# Patient Record
Sex: MALE | Race: WHITE | NOT HISPANIC OR LATINO | Employment: OTHER | URBAN - METROPOLITAN AREA
[De-identification: names, ages, dates, MRNs, and addresses within clinical notes are randomized per-mention and may not be internally consistent; named-entity substitution may affect disease eponyms.]

---

## 2019-08-15 ENCOUNTER — TELEPHONE (OUTPATIENT)
Dept: SURGERY | Facility: CLINIC | Age: 64
End: 2019-08-15

## 2019-08-15 ENCOUNTER — OFFICE VISIT (OUTPATIENT)
Dept: SURGERY | Facility: CLINIC | Age: 64
End: 2019-08-15
Payer: COMMERCIAL

## 2019-08-15 VITALS
SYSTOLIC BLOOD PRESSURE: 120 MMHG | RESPIRATION RATE: 16 BRPM | OXYGEN SATURATION: 96 % | WEIGHT: 245 LBS | HEIGHT: 70 IN | DIASTOLIC BLOOD PRESSURE: 78 MMHG | HEART RATE: 81 BPM | BODY MASS INDEX: 35.07 KG/M2

## 2019-08-15 DIAGNOSIS — K42.0 UMBILICAL HERNIA WITH OBSTRUCTION: Primary | ICD-10-CM

## 2019-08-15 PROCEDURE — 99244 OFF/OP CNSLTJ NEW/EST MOD 40: CPT | Performed by: SURGERY

## 2019-08-15 ASSESSMENT — MIFFLIN-ST. JEOR: SCORE: 1907.56

## 2019-08-15 NOTE — PROGRESS NOTES
"Fredi is a 63 year old male who presents for hernia evaluation.  The patient has noticed a bulge at the umbilicus.  Pain has not been present.  Symptoms began 1 year ago.  The bulge has gotten larger over the intervening time. He built a house during this time as well. The patient has not had previous abdominal surgery.  Patient does not report that increased activity/lifting causes pain. Employment does not require lifting; he retired from being a  about ten years ago. 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    Constipation No  Dysuria No  Cough No  Smoking Yes    Pt's chart has been reviewed for PMH, PSH, allergies, medications, social and family history.    ROS:  Pulm:  No shortness of breath, dyspnea on exertion, cough, or hemoptysis  CV:  negative  ABD:  See chief complaint  :  Negative  All other systems on 10 point review are negative.    /78   Pulse 81   Resp 16   Ht 1.778 m (5' 10\")   Wt 111.1 kg (245 lb)   SpO2 96%   BMI 35.15 kg/m    Physical exam:  Patient able to get up on table without difficulty.  abdomen is soft without significant tenderness, masses, organomegaly or guarding  bowel sounds are positive and no caput medusa noted.  There is visible eversion to the umbilical skin which is a fat containing and only partly reducible umbilical hernia. It is non-tender on exam.    Imaging  None    Assesment: umbilical hernia    Plan: The nature of hernias, anatomic considerations, indications and approaches to repair were discussed.  Risks of operative intervention were discussed including infection, bleeding, harm to structures as well as recurrence, which is about 5% per decade of life.  Post operative recuperation and activity limitations were reviewed as well.  The patient is interested in pursuing repair and we will assist in scheduling this with him.    Tung Arnold MD  8/15/2019 9:56 AM    Please route or send letter to:  Primary Care Provider " (PCP)

## 2019-08-15 NOTE — LETTER
"August 15, 2019    RE: Fredi Reilly, : 1955      Fredi is a 63 year old male who presents for hernia evaluation.  The patient has noticed a bulge at the umbilicus.  Pain has not been present.  Symptoms began 1 year ago.  The bulge has gotten larger over the intervening time. He built a house during this time as well. The patient has not had previous abdominal surgery.  Patient does not report that increased activity/lifting causes pain. Employment does not require lifting; he retired from being a  about ten years ago. 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     Constipation No  Dysuria No  Cough No  Smoking Yes     Pt's chart has been reviewed for PMH, PSH, allergies, medications, social and family history.     ROS:  Pulm:  No shortness of breath, dyspnea on exertion, cough, or hemoptysis  CV:  negative  ABD:  See chief complaint  :  Negative  All other systems on 10 point review are negative.     /78   Pulse 81   Resp 16   Ht 1.778 m (5' 10\")   Wt 111.1 kg (245 lb)   SpO2 96%   BMI 35.15 kg/m    Physical exam:  Patient able to get up on table without difficulty.  abdomen is soft without significant tenderness, masses, organomegaly or guarding  bowel sounds are positive and no caput medusa noted.  There is visible eversion to the umbilical skin which is a fat containing and only partly reducible umbilical hernia. It is non-tender on exam.     Imaging  None     Assesment: umbilical hernia     Plan: The nature of hernias, anatomic considerations, indications and approaches to repair were discussed.  Risks of operative intervention were discussed including infection, bleeding, harm to structures as well as recurrence, which is about 5% per decade of life.  Post operative recuperation and activity limitations were reviewed as well.  The patient is interested in pursuing repair and we will assist in scheduling this with him.     Tung Arnold MD  "

## 2019-10-25 ASSESSMENT — MIFFLIN-ST. JEOR: SCORE: 1886.38

## 2019-11-01 ENCOUNTER — APPOINTMENT (OUTPATIENT)
Dept: SURGERY | Facility: PHYSICIAN GROUP | Age: 64
End: 2019-11-01
Payer: MEDICARE

## 2019-11-01 ENCOUNTER — ANESTHESIA (OUTPATIENT)
Dept: SURGERY | Facility: CLINIC | Age: 64
End: 2019-11-01
Payer: MEDICARE

## 2019-11-01 ENCOUNTER — ANESTHESIA EVENT (OUTPATIENT)
Dept: SURGERY | Facility: CLINIC | Age: 64
End: 2019-11-01
Payer: MEDICARE

## 2019-11-01 ENCOUNTER — HOSPITAL ENCOUNTER (OUTPATIENT)
Facility: CLINIC | Age: 64
Discharge: HOME | End: 2019-11-01
Attending: SURGERY | Admitting: SURGERY
Payer: MEDICARE

## 2019-11-01 VITALS
TEMPERATURE: 97.1 F | SYSTOLIC BLOOD PRESSURE: 135 MMHG | DIASTOLIC BLOOD PRESSURE: 89 MMHG | RESPIRATION RATE: 16 BRPM | OXYGEN SATURATION: 96 % | HEIGHT: 69 IN | BODY MASS INDEX: 36.58 KG/M2 | WEIGHT: 247 LBS | HEART RATE: 69 BPM

## 2019-11-01 DIAGNOSIS — K42.9 UMBILICAL HERNIA WITHOUT OBSTRUCTION AND WITHOUT GANGRENE: Primary | ICD-10-CM

## 2019-11-01 PROCEDURE — 25000132 ZZH RX MED GY IP 250 OP 250 PS 637: Mod: GY | Performed by: SURGERY

## 2019-11-01 PROCEDURE — 36000052 ZZH SURGERY LEVEL 2 EA 15 ADDTL MIN: Performed by: SURGERY

## 2019-11-01 PROCEDURE — 25000125 ZZHC RX 250: Performed by: NURSE ANESTHETIST, CERTIFIED REGISTERED

## 2019-11-01 PROCEDURE — 25000128 H RX IP 250 OP 636: Performed by: ANESTHESIOLOGY

## 2019-11-01 PROCEDURE — 40000306 ZZH STATISTIC PRE PROC ASSESS II: Performed by: SURGERY

## 2019-11-01 PROCEDURE — 93010 ELECTROCARDIOGRAM REPORT: CPT | Performed by: INTERNAL MEDICINE

## 2019-11-01 PROCEDURE — 71000027 ZZH RECOVERY PHASE 2 EACH 15 MINS: Performed by: SURGERY

## 2019-11-01 PROCEDURE — 36000050 ZZH SURGERY LEVEL 2 1ST 30 MIN: Performed by: SURGERY

## 2019-11-01 PROCEDURE — C1781 MESH (IMPLANTABLE): HCPCS | Performed by: SURGERY

## 2019-11-01 PROCEDURE — 37000008 ZZH ANESTHESIA TECHNICAL FEE, 1ST 30 MIN: Performed by: SURGERY

## 2019-11-01 PROCEDURE — 49587 ZZHC REPAIR UMBILICAL HERN,5+Y/O, INCARCERATED OR STRANGULATED: CPT | Mod: AS | Performed by: PHYSICIAN ASSISTANT

## 2019-11-01 PROCEDURE — 25800030 ZZH RX IP 258 OP 636: Performed by: ANESTHESIOLOGY

## 2019-11-01 PROCEDURE — 25000125 ZZHC RX 250: Performed by: ANESTHESIOLOGY

## 2019-11-01 PROCEDURE — 25000128 H RX IP 250 OP 636: Performed by: SURGERY

## 2019-11-01 PROCEDURE — 49587 ZZHC REPAIR UMBILICAL HERN,5+Y/O, INCARCERATED OR STRANGULATED: CPT | Performed by: SURGERY

## 2019-11-01 PROCEDURE — 71000012 ZZH RECOVERY PHASE 1 LEVEL 1 FIRST HR: Performed by: SURGERY

## 2019-11-01 PROCEDURE — 25000128 H RX IP 250 OP 636: Performed by: PHYSICIAN ASSISTANT

## 2019-11-01 PROCEDURE — 25000128 H RX IP 250 OP 636: Performed by: NURSE ANESTHETIST, CERTIFIED REGISTERED

## 2019-11-01 PROCEDURE — 25000132 ZZH RX MED GY IP 250 OP 250 PS 637: Mod: GY | Performed by: ANESTHESIOLOGY

## 2019-11-01 PROCEDURE — 37000009 ZZH ANESTHESIA TECHNICAL FEE, EACH ADDTL 15 MIN: Performed by: SURGERY

## 2019-11-01 PROCEDURE — 27210794 ZZH OR GENERAL SUPPLY STERILE: Performed by: SURGERY

## 2019-11-01 DEVICE — MESH VENTRALEX HERNIA 2.5" CIRCLE MED W/STRAP 5950008: Type: IMPLANTABLE DEVICE | Status: FUNCTIONAL

## 2019-11-01 RX ORDER — ONDANSETRON 2 MG/ML
4 INJECTION INTRAMUSCULAR; INTRAVENOUS EVERY 30 MIN PRN
Status: DISCONTINUED | OUTPATIENT
Start: 2019-11-01 | End: 2019-11-01 | Stop reason: HOSPADM

## 2019-11-01 RX ORDER — DEXAMETHASONE SODIUM PHOSPHATE 4 MG/ML
INJECTION, SOLUTION INTRA-ARTICULAR; INTRALESIONAL; INTRAMUSCULAR; INTRAVENOUS; SOFT TISSUE PRN
Status: DISCONTINUED | OUTPATIENT
Start: 2019-11-01 | End: 2019-11-01

## 2019-11-01 RX ORDER — MEPERIDINE HYDROCHLORIDE 50 MG/ML
12.5 INJECTION INTRAMUSCULAR; INTRAVENOUS; SUBCUTANEOUS
Status: DISCONTINUED | OUTPATIENT
Start: 2019-11-01 | End: 2019-11-01 | Stop reason: HOSPADM

## 2019-11-01 RX ORDER — HYDROCODONE BITARTRATE AND ACETAMINOPHEN 5; 325 MG/1; MG/1
1-2 TABLET ORAL EVERY 4 HOURS PRN
Status: DISCONTINUED | OUTPATIENT
Start: 2019-11-01 | End: 2019-11-01 | Stop reason: HOSPADM

## 2019-11-01 RX ORDER — HYDRALAZINE HYDROCHLORIDE 20 MG/ML
2.5-5 INJECTION INTRAMUSCULAR; INTRAVENOUS EVERY 10 MIN PRN
Status: DISCONTINUED | OUTPATIENT
Start: 2019-11-01 | End: 2019-11-01 | Stop reason: HOSPADM

## 2019-11-01 RX ORDER — NALOXONE HYDROCHLORIDE 0.4 MG/ML
.1-.4 INJECTION, SOLUTION INTRAMUSCULAR; INTRAVENOUS; SUBCUTANEOUS
Status: DISCONTINUED | OUTPATIENT
Start: 2019-11-01 | End: 2019-11-01 | Stop reason: HOSPADM

## 2019-11-01 RX ORDER — METOPROLOL TARTRATE 1 MG/ML
1-2 INJECTION, SOLUTION INTRAVENOUS EVERY 5 MIN PRN
Status: DISCONTINUED | OUTPATIENT
Start: 2019-11-01 | End: 2019-11-01 | Stop reason: HOSPADM

## 2019-11-01 RX ORDER — ONDANSETRON 2 MG/ML
INJECTION INTRAMUSCULAR; INTRAVENOUS PRN
Status: DISCONTINUED | OUTPATIENT
Start: 2019-11-01 | End: 2019-11-01

## 2019-11-01 RX ORDER — GLYCOPYRROLATE 0.2 MG/ML
INJECTION, SOLUTION INTRAMUSCULAR; INTRAVENOUS PRN
Status: DISCONTINUED | OUTPATIENT
Start: 2019-11-01 | End: 2019-11-01

## 2019-11-01 RX ORDER — HYDROCODONE BITARTRATE AND ACETAMINOPHEN 5; 325 MG/1; MG/1
1 TABLET ORAL
Status: COMPLETED | OUTPATIENT
Start: 2019-11-01 | End: 2019-11-01

## 2019-11-01 RX ORDER — ONDANSETRON 4 MG/1
4 TABLET, ORALLY DISINTEGRATING ORAL EVERY 30 MIN PRN
Status: DISCONTINUED | OUTPATIENT
Start: 2019-11-01 | End: 2019-11-01 | Stop reason: HOSPADM

## 2019-11-01 RX ORDER — TAMSULOSIN HYDROCHLORIDE 0.4 MG/1
0.4 CAPSULE ORAL
Status: DISCONTINUED | OUTPATIENT
Start: 2019-11-01 | End: 2019-11-01 | Stop reason: HOSPADM

## 2019-11-01 RX ORDER — HYDROCODONE BITARTRATE AND ACETAMINOPHEN 5; 325 MG/1; MG/1
1-2 TABLET ORAL EVERY 4 HOURS PRN
Qty: 10 TABLET | Refills: 0 | Status: SHIPPED | OUTPATIENT
Start: 2019-11-01 | End: 2020-02-26

## 2019-11-01 RX ORDER — FENTANYL CITRATE 50 UG/ML
25-50 INJECTION, SOLUTION INTRAMUSCULAR; INTRAVENOUS EVERY 5 MIN PRN
Status: DISCONTINUED | OUTPATIENT
Start: 2019-11-01 | End: 2019-11-01 | Stop reason: HOSPADM

## 2019-11-01 RX ORDER — FENTANYL CITRATE 50 UG/ML
25-50 INJECTION, SOLUTION INTRAMUSCULAR; INTRAVENOUS
Status: DISCONTINUED | OUTPATIENT
Start: 2019-11-01 | End: 2019-11-01 | Stop reason: HOSPADM

## 2019-11-01 RX ORDER — PROPOFOL 10 MG/ML
INJECTION, EMULSION INTRAVENOUS PRN
Status: DISCONTINUED | OUTPATIENT
Start: 2019-11-01 | End: 2019-11-01

## 2019-11-01 RX ORDER — KETAMINE HYDROCHLORIDE 10 MG/ML
INJECTION, SOLUTION INTRAMUSCULAR; INTRAVENOUS PRN
Status: DISCONTINUED | OUTPATIENT
Start: 2019-11-01 | End: 2019-11-01

## 2019-11-01 RX ORDER — BUPIVACAINE HYDROCHLORIDE 5 MG/ML
INJECTION, SOLUTION EPIDURAL; INTRACAUDAL PRN
Status: DISCONTINUED | OUTPATIENT
Start: 2019-11-01 | End: 2019-11-01 | Stop reason: HOSPADM

## 2019-11-01 RX ORDER — PROPOFOL 10 MG/ML
INJECTION, EMULSION INTRAVENOUS CONTINUOUS PRN
Status: DISCONTINUED | OUTPATIENT
Start: 2019-11-01 | End: 2019-11-01

## 2019-11-01 RX ORDER — CEFAZOLIN SODIUM 2 G/100ML
2 INJECTION, SOLUTION INTRAVENOUS
Status: COMPLETED | OUTPATIENT
Start: 2019-11-01 | End: 2019-11-01

## 2019-11-01 RX ORDER — OXYCODONE HYDROCHLORIDE 5 MG/1
5 TABLET ORAL EVERY 4 HOURS PRN
Status: DISCONTINUED | OUTPATIENT
Start: 2019-11-01 | End: 2019-11-01 | Stop reason: HOSPADM

## 2019-11-01 RX ORDER — LIDOCAINE 40 MG/G
CREAM TOPICAL
Status: DISCONTINUED | OUTPATIENT
Start: 2019-11-01 | End: 2019-11-01 | Stop reason: HOSPADM

## 2019-11-01 RX ORDER — ALBUTEROL SULFATE 0.83 MG/ML
2.5 SOLUTION RESPIRATORY (INHALATION) EVERY 4 HOURS PRN
Status: DISCONTINUED | OUTPATIENT
Start: 2019-11-01 | End: 2019-11-01 | Stop reason: HOSPADM

## 2019-11-01 RX ORDER — CEFAZOLIN SODIUM 1 G/3ML
1 INJECTION, POWDER, FOR SOLUTION INTRAMUSCULAR; INTRAVENOUS SEE ADMIN INSTRUCTIONS
Status: DISCONTINUED | OUTPATIENT
Start: 2019-11-01 | End: 2019-11-01 | Stop reason: HOSPADM

## 2019-11-01 RX ORDER — SODIUM CHLORIDE, SODIUM LACTATE, POTASSIUM CHLORIDE, CALCIUM CHLORIDE 600; 310; 30; 20 MG/100ML; MG/100ML; MG/100ML; MG/100ML
INJECTION, SOLUTION INTRAVENOUS CONTINUOUS
Status: DISCONTINUED | OUTPATIENT
Start: 2019-11-01 | End: 2019-11-01 | Stop reason: HOSPADM

## 2019-11-01 RX ORDER — FENTANYL CITRATE 50 UG/ML
INJECTION, SOLUTION INTRAMUSCULAR; INTRAVENOUS PRN
Status: DISCONTINUED | OUTPATIENT
Start: 2019-11-01 | End: 2019-11-01

## 2019-11-01 RX ORDER — HYDROMORPHONE HYDROCHLORIDE 1 MG/ML
.3-.5 INJECTION, SOLUTION INTRAMUSCULAR; INTRAVENOUS; SUBCUTANEOUS EVERY 10 MIN PRN
Status: DISCONTINUED | OUTPATIENT
Start: 2019-11-01 | End: 2019-11-01 | Stop reason: HOSPADM

## 2019-11-01 RX ADMIN — SODIUM CHLORIDE, POTASSIUM CHLORIDE, SODIUM LACTATE AND CALCIUM CHLORIDE: 600; 310; 30; 20 INJECTION, SOLUTION INTRAVENOUS at 08:41

## 2019-11-01 RX ADMIN — PROPOFOL 100 MCG/KG/MIN: 10 INJECTION, EMULSION INTRAVENOUS at 08:55

## 2019-11-01 RX ADMIN — FENTANYL CITRATE 50 MCG: 50 INJECTION, SOLUTION INTRAMUSCULAR; INTRAVENOUS at 09:43

## 2019-11-01 RX ADMIN — HYDROCODONE BITARTRATE AND ACETAMINOPHEN 1 TABLET: 5; 325 TABLET ORAL at 11:05

## 2019-11-01 RX ADMIN — OXYCODONE HYDROCHLORIDE 5 MG: 5 TABLET ORAL at 10:03

## 2019-11-01 RX ADMIN — MIDAZOLAM 2 MG: 1 INJECTION INTRAMUSCULAR; INTRAVENOUS at 08:41

## 2019-11-01 RX ADMIN — PROPOFOL 200 MG: 10 INJECTION, EMULSION INTRAVENOUS at 08:47

## 2019-11-01 RX ADMIN — LIDOCAINE HYDROCHLORIDE 50 MG: 10 INJECTION, SOLUTION EPIDURAL; INFILTRATION; INTRACAUDAL; PERINEURAL at 08:47

## 2019-11-01 RX ADMIN — ONDANSETRON HYDROCHLORIDE 4 MG: 2 INJECTION, SOLUTION INTRAVENOUS at 09:21

## 2019-11-01 RX ADMIN — CEFAZOLIN SODIUM 2 G: 2 INJECTION, SOLUTION INTRAVENOUS at 08:50

## 2019-11-01 RX ADMIN — FENTANYL CITRATE 100 MCG: 50 INJECTION, SOLUTION INTRAMUSCULAR; INTRAVENOUS at 08:47

## 2019-11-01 RX ADMIN — KETAMINE HYDROCHLORIDE 30 MG: 10 INJECTION, SOLUTION INTRAMUSCULAR; INTRAVENOUS at 09:06

## 2019-11-01 RX ADMIN — DEXAMETHASONE SODIUM PHOSPHATE 4 MG: 4 INJECTION, SOLUTION INTRA-ARTICULAR; INTRALESIONAL; INTRAMUSCULAR; INTRAVENOUS; SOFT TISSUE at 09:08

## 2019-11-01 RX ADMIN — GLYCOPYRROLATE 0.2 MG: 0.2 INJECTION, SOLUTION INTRAMUSCULAR; INTRAVENOUS at 08:41

## 2019-11-01 ASSESSMENT — MIFFLIN-ST. JEOR: SCORE: 1896.01

## 2019-11-01 NOTE — DISCHARGE INSTRUCTIONS
"HOME CARE FOLLOWING UMBILICAL/VENTRAL HERNIA REPAIR  JEANINE Whittington, WENDY Lorenzo, VARUN Joe, MUNDO Gipson    DIET:  No restrictions.  Increased fluid intake is recommended. While taking pain medications, increase dietary fiber or add a fiber supplementation like Metamucil or Citrucel to help prevent constipation - a possible side effect of pain medications.    NAUSEA:  If nauseated from the anesthetic/pain meds; rest in bed, get up cautiously with assistance, and drink clear liquids (juice, tea, broth).    ACTIVITY:  Light Activity -- you may immediately be up and about as tolerated.  Driving -- you may drive when comfortable and off narcotic pain medications.  Light Work -- resume when comfortable off pain medications.  (If you can drive, you probably can work.)  Strenuous Work/Activity -- limit lifting to 20 pounds for 3 weeks.  Active Sports (running, biking, etc.) -- cautiously resume after 4 weeks.    INCISIONAL CARE:    If you have a dressing in place, keep clean and dry for 48 hours after surgery.  After this timeframe, you may replace the gauze daily if it becomes soiled.    You may remove the dressing and shower 48 hours after surgery.  Do not submerse incision in water for 1 week.    If you have a Dermabond dressing (a type of skin glue), you may shower immediately.    Sutures will absorb and need not be removed.    If present, leave the steri-strips (white paper tapes) in place for 14 days after surgery.    If present, leave Dermabond glue in place until it wears/flakes off.    Expect a variable amount of swelling/bruising/discoloration that may appear around or below the repair site.    Some numbness around the incision is common.    A lump/\"healing ridge\" under the incision is normal and will gradually resolve over the following 1-2 months.    DISCOMFORT:  Local anesthetic placed at surgery should provide relief for 4-8 hours.  Begin taking pain pills before discomfort is " severe.  Take the pain medication with some food, when possible, to minimize side effects.  Intermittent use of ice packs to the hernia repair site may help during the first 1-3 weeks after surgery.  Expect gradual improvement.    Over-the-counter anti-inflammatory medications (i.e. Ibuprofen/Advil/Motrin or Naprosyn/Aleve) may be used per package instructions in addition to or while tapering off the narcotic pain medications to decrease swelling and sensitivity at the repair site.  DO NOT TAKE these Anti-inflammatory medications if your primary physician has advised against doing so, or if you have acid reflux, ulcer, or bleeding disorder, or take blood-thinner medications.  Call your primary physician or the surgery office if you have medication questions.      RETURN APPOINTMENT:  Schedule a follow-up visit 2-3 weeks post-op.  Office Phone:  [phone]     CONTACT US IF THE FOLLOWING DEVELOPS:   1. A fever that is above 101     2. If there is a large amount of drainage, bleeding, or swelling.   3. Severe pain that is not relieved by your prescription.   4. Drainage that is thick, cloudy, yellow, green or white.   5. Any other questions not answered by  Frequently Asked Questions  sheet.      FREQUENTLY ASKED QUESTIONS:    Q:  How should my incision look?    A:  Normally your incision will appear slightly swollen with light redness directly along the incision itself as it heals.  It may feel like a bump or ridge as the healing/scarring happens, and over time (3-4 months) this bump or ridge feeling should slowly go away.  In general, clear or pink watery drainage can be normal at first as your incision heals, but should decrease over time.    Q:  How do I know if my incision is infected?  A:  Look at your incision for signs of infection, like redness around the incision spreading to surrounding skin, or drainage of cloudy or foul-smelling drainage.  If you feel warm, check your temperature to see if you are  running a fever.    **If any of these things occur, please notify the nurse at our office.  We may need you to come into the office for an incision check.      Q:  How do I take care of my incision?  A:  If you have a dressing in place - Starting the day after surgery, replace the dressing 1-2 times a day until there is no further drainage from the incision.  At that time, a dressing is no longer needed.  Try to minimize tape on the skin if irritation is occurring at the tape sites.  If you have significant irritation from tape on the skin, please call the office to discuss other method of dressing your incision.    Small pieces of tape called  steri-strips  may be present directly overlying your incision; these may be removed 10 days after surgery unless otherwise specified by your surgeon.  If these tapes start to loosen at the ends, you may trim them back until they fall off or are removed.    A:  If you had  Dermabond  tissue glue used as a dressing (this causes your incision to look shiny with a clear covering over it) - This type of dressing wears off with time and does not require more dressings over the top unless it is draining around the glue as it wears off.  Do not apply ointments or lotions over the incisions until the glue has completely worn off.    Q:  There is a piece of tape or a sticky  lead  still on my skin.  Can I remove this?  A:  Sometimes the sticky  leads  used for monitoring during surgery or for evaluation in the emergency department are not all removed while you are in the hospital.  These sometimes have a tab or metal dot on them.  You can easily remove these on your own, like taking off a band-aid.  If there is a gel substance under the  lead , simply wipe/clean it off with a washcloth or paper towel.      Q:  What can I do to minimize constipation (very hard stools, or lack of stools)?  A:  Stay well hydrated.  Increase your dietary fiber intake or take a fiber supplement -with plenty  of water.  Walk around frequently.  You may consider an over-the-counter stool-softener.  Your Pharmacist can assist you with choosing one that is stocked at your pharmacy.  Constipation is also one of the most common side effects of pain medication.  If you are using pain medication, be pro-active and try to PREVENT problems with constipation by taking the steps above BEFORE constipation becomes a problem.    Q:  What do I do if I need more pain medications?  A:  Call the office to receive refills.  Be aware that certain pain meds cannot be called into a pharmacy and actually require a paper prescription.  A change may be made in your pain med as you progress thru your recovery period or if you have side effects to certain meds.    --Pain meds are NOT refilled after 5pm on weekdays, and NOT AT ALL on the weekends, so please look ahead to prevent problems.      Q:  Why am I having a hard time sleeping now that I am at home?  A:  Many medications you receive while you are in the hospital can impact your sleep for a number of days after your surgery/hospitalization.  Decreased level of activity and naps during the day may also make sleeping at night difficult.  Try to minimize day-time naps, and get up frequently during the day to walk around your home during your recovery time.  Sleep aides may be of some help, but are not recommended for long-term use.      Q:  I am having some back discomfort.  What should I do?  A:  This may be related to certain positioning that was required for your surgery, extended periods of time in bed, or other changes in your overall activity level.  You may try ice, heat, acetaminophen, or ibuprofen to treat this temporarily.  Note that many pain medications have acetaminophen in them and would state this on the prescription bottle.  Be sure not to exceed the maximum of 4000mg per day of acetaminophen.     **If the pain you are having does not resolve, is severe, or is a flare of back  pain you have had on other occasions prior to surgery, please contact your primary physician for further recommendations or for an appointment to be examined at their office.    Q:  Why am I having headaches?  A:  Headaches can be caused by many things:  caffeine withdrawal, use of pain meds, dehydration, high blood pressure, lack of sleep, over-activity/exhaustion, flare-up of usual migraine headaches.  If you feel this is related to muscle tension (a band-like feeling around the head, or a pressure at the low-back of the head) you may try ice or heat to this area.  You may need to drink more fluids (try electrolyte drink like Gatorade), rest, or take your usual migraine medications.   **If your headaches do not resolve, worsen, are accompanied by other symptoms, or if your blood pressure is high, please call your primary physician for recommendation and/or examination.    Q:  I am unable to urinate.  What do I do?  A:  A small percentage of people can have difficulty urinating initially after surgery.  This includes being able to urinate only a very small amount at a time and feeling discomfort or pressure in the very low abdomen.  This is called  urinary retention , and is actually an urgent situation.  Proceed to your nearest Emergency department for evaluation (not an Urgent Care Center).  Sometimes the bladder does not work correctly after certain medications you receive during surgery, or related to certain procedures.  You may need to have a catheter placed until your bladder recovers.  When planning to go to an Emergency department, it may help to call the ER to let them know you are coming in for this problem after a surgery.  This may help you get in quicker to be evaluated.  **If you have symptoms of a urinary tract infection, please contact your primary physician for the proper evaluation and treatment.    If you have other questions, please call the office Monday thru Friday between 8am and 5pm to  discuss with the nurse or physician assistant.  #[phone]    There is a surgeon ON CALL on weekday evenings and over the weekend in case of urgent need only, and may be contacted at the same number.    If you are having an emergency, call 911 or proceed to your nearest emergency department.    GENERAL ANESTHESIA OR SEDATION ADULT DISCHARGE INSTRUCTIONS   SPECIAL PRECAUTIONS FOR 24 HOURS AFTER SURGERY    IT IS NOT UNUSUAL TO FEEL LIGHT-HEADED OR FAINT, UP TO 24 HOURS AFTER SURGERY OR WHILE TAKING PAIN MEDICATION.  IF YOU HAVE THESE SYMPTOMS; SIT FOR A FEW MINUTES BEFORE STANDING AND HAVE SOMEONE ASSIST YOU WHEN YOU GET UP TO WALK OR USE THE BATHROOM.    YOU SHOULD REST AND RELAX FOR THE NEXT 24 HOURS AND YOU MUST MAKE ARRANGEMENTS TO HAVE SOMEONE STAY WITH YOU FOR AT LEAST 24 HOURS AFTER YOUR DISCHARGE.  AVOID HAZARDOUS AND STRENUOUS ACTIVITIES.  DO NOT MAKE IMPORTANT DECISIONS FOR 24 HOURS.    DO NOT DRIVE ANY VEHICLE OR OPERATE MECHANICAL EQUIPMENT FOR 24 HOURS FOLLOWING THE END OF YOUR SURGERY.  EVEN THOUGH YOU MAY FEEL NORMAL, YOUR REACTIONS MAY BE AFFECTED BY THE MEDICATION YOU HAVE RECEIVED.    DO NOT DRINK ALCOHOLIC BEVERAGES FOR 24 HOURS FOLLOWING YOUR SURGERY.    DRINK CLEAR LIQUIDS (APPLE JUICE, GINGER ALE, 7-UP, BROTH, ETC.).  PROGRESS TO YOUR REGULAR DIET AS YOU FEEL ABLE.    YOU MAY HAVE A DRY MOUTH, A SORE THROAT, MUSCLES ACHES OR TROUBLE SLEEPING.  THESE SHOULD GO AWAY AFTER 24 HOURS.    CALL YOUR DOCTOR FOR ANY OF THE FOLLOWING:  SIGNS OF INFECTION (FEVER, GROWING TENDERNESS AT THE SURGERY SITE, A LARGE AMOUNT OF DRAINAGE OR BLEEDING, SEVERE PAIN, FOUL-SMELLING DRAINAGE, REDNESS OR SWELLING.    IT HAS BEEN OVER 8 TO 10 HOURS SINCE SURGERY AND YOU ARE STILL NOT ABLE TO URINATE (PASS WATER).         Oxycodone 5 mg at 10:04 am

## 2019-11-01 NOTE — ANESTHESIA POSTPROCEDURE EVALUATION
Patient: Fredi Reilly    Procedure(s):  Open umbilical hernia repair with mesh    Diagnosis:umbilical hernia  Diagnosis Additional Information: No value filed.    Anesthesia Type:  General    Note:  Anesthesia Post Evaluation    Patient location during evaluation: PACU  Patient participation: Able to fully participate in evaluation  Level of consciousness: awake  Pain management: adequate  Airway patency: patent  Cardiovascular status: acceptable  Respiratory status: acceptable  Hydration status: acceptable  PONV: controlled     Anesthetic complications: None          Last vitals:  Vitals:    11/01/19 1105 11/01/19 1112 11/01/19 1130   BP:  (!) 131/94 135/89   Pulse:      Resp:  16 16   Temp:      SpO2: 95%  96%         Electronically Signed By: Braulio Rodriguez MD  November 1, 2019  12:05 PM

## 2019-11-01 NOTE — ANESTHESIA PREPROCEDURE EVALUATION
"Anesthesia Pre-Procedure Evaluation    Patient: Fredi Reilly   MRN: [id number] : 1955          Preoperative Diagnosis: umbilical hernia    Procedure(s):  Open umbilical hernia repair with mesh    Past Medical History:   Diagnosis Date     Gastroesophageal reflux disease      Hiatal hernia      Past Surgical History:   Procedure Laterality Date     ORTHOPEDIC SURGERY  2015    knee     Anesthesia Evaluation     . Pt has had prior anesthetic.            ROS/MED HX    ENT/Pulmonary:  - neg pulmonary ROS    (-) sleep apnea   Neurologic:       Cardiovascular:  - neg cardiovascular ROS       METS/Exercise Tolerance:     Hematologic:         Musculoskeletal:         GI/Hepatic:     (+) GERD       Renal/Genitourinary:         Endo:     (+) Obesity, .      Psychiatric:         Infectious Disease:         Malignancy:         Other:                          Physical Exam      Airway   Mallampati: II  TM distance: >3 FB  Neck ROM: full    Dental     Cardiovascular   Rhythm and rate: regular and normal      Pulmonary    breath sounds clear to auscultation            No results found for: WBC, HGB, HCT, PLT, CRP, SED, NA, POTASSIUM, CHLORIDE, CO2, BUN, CR, GLC, MUSHTAQ, PHOS, MAG, ALBUMIN, PROTTOTAL, ALT, AST, GGT, ALKPHOS, BILITOTAL, BILIDIRECT, LIPASE, AMYLASE, LETICIA, PTT, INR, FIBR, TSH, T4, T3, HCG, HCGS, CKTOTAL, CKMB, TROPN    Preop Vitals  BP Readings from Last 3 Encounters:   19 118/77   08/15/19 120/78    Pulse Readings from Last 3 Encounters:   08/15/19 81      Resp Readings from Last 3 Encounters:   19 20   08/15/19 16    SpO2 Readings from Last 3 Encounters:   19 96%   08/15/19 96%      Temp Readings from Last 1 Encounters:   19 98  F (36.7  C) (Temporal)    Ht Readings from Last 1 Encounters:   19 1.753 m (5' 9.02\")      Wt Readings from Last 1 Encounters:   19 112 kg (247 lb)    Estimated body mass index is 36.46 kg/m  as calculated from the following:    Height as of " "this encounter: 1.753 m (5' 9.02\").    Weight as of this encounter: 112 kg (247 lb).       Anesthesia Plan      History & Physical Review  History and physical reviewed and following examination; no interval change.    ASA Status:  2 .    NPO Status:  > 8 hours    Plan for General with Intravenous and Propofol induction. Maintenance will be Balanced.    PONV prophylaxis:  Ondansetron (or other 5HT-3)       Postoperative Care  Postoperative pain management:  IV analgesics, Oral pain medications and Multi-modal analgesia.      Consents  Anesthetic plan, risks, benefits and alternatives discussed with:  Patient..                 Braulio Rodriguez MD                    .  "

## 2019-11-01 NOTE — OP NOTE
Procedure Date: 11/01/2019      PREOPERATIVE DIAGNOSIS:  Symptomatic umbilical hernia.      POSTOPERATIVE DIAGNOSIS:  Symptomatic umbilical hernia.      PROCEDURE:  Open repair of umbilical hernia with placement of underlying mesh.      ANESTHESIA:  General plus local.      SURGEON:  Tung Arnold MD      ASSISTANT:  Brandi Burns PA-C.  Physician assistant was medically necessary for her skills in suturing, cutting the suture, exposure, traction, countertraction and suctioning throughout the operation.      SPECIMENS:  None submitted.      COMPLICATIONS:  None.      INDICATIONS:  Mr. Reilly is a 64-year-old gentleman with a 1-year history of an umbilical hernia, which was getting larger in the intervening time since its discovery.  He was having discomfort at the site and wished to have it repaired.  Risks of procedure including infection, bleeding, harm to adjacent structures, mesh infection and hernia recurrence were reviewed.  The patient verbalized understanding of all the above and consented to proceed.      FINDINGS:  The patient had a primary fascial defect measuring approximately 2 cm in diameter with incarcerated omentum in the hernia sac.  This was repaired with a 6.4 cm Ventralex patch in an underlay fashion.      DESCRIPTION OF PROCEDURE:  With the patient under excellent general anesthesia in supine position, the abdomen was clippered and prepped and draped out in the usual sterile surgical fashion.  A timeout was then performed confirming the patient, procedure to be done as well as drug allergies and site markings.  The patient did receive a dose of Ancef for infection prophylaxis prior to making our initial incision.  We began by making a curvilinear incision at the superior aspect of the umbilicus and dissected into the subcutaneous fat with electrocautery.  We encountered a hernia sac immediately and dissected this sharply from the underside of the umbilical dermis.  This was then  circumferentially dissected free from the surrounding fat.  The neck of the defect was quite narrow and the contents quite large.  We incised the sac towards the fascial interface and amputated a small amount of omentum free from the contents, allowing us to reduce the remainder of the omentum.  The fascial edges were then grasped the anterior surface of the fascia cleared of fat in all directions and a preperitoneal space was then developed by elevating the fascial edge and cauterizing circumferentially for several centimeters.  As noted above, the defect was approximately 2 cm in diameter.  A 6.4 cm Ventralex patch was introduced into the field, soaked in Aricept and placed in the preperitoneal space and unrolled flatly against the posterior aspect of the abdominal wall.  This was then transfascially tacked with 0 PDS stitches superiorly, inferiorly and laterally.  The defect was then closed over the mesh with 0 PDS in a horizontal mattress fashion and 20 mL of 0.5% Marcaine were then instilled the deep tissues and subcutaneous space.  The umbilical dermis was then reapproximated to the fascia with a 3-0 Vicryl and skin was closed in 2 layers with 3-0 and 4-0 Vicryls.  Skin glue was applied atop the closed wound.  The patient tolerated the procedure well.  He was extubated and brought to recovery in excellent condition.  All sharps and sponge counts were correct at the conclusion of the case.         ALEXANDRO ESPINAL MD             D: 2019   T: 2019   MT: MAXIMILIANO      Name:     GATO OROZCO   MRN:      [id number]        Account:        [id number]   :      1955           Procedure Date: 2019      Document: [id number]       cc: Dereje Camacho DO

## 2019-11-01 NOTE — ANESTHESIA CARE TRANSFER NOTE
Patient: Fredi Reilly    Procedure(s):  Open umbilical hernia repair with mesh    Diagnosis: umbilical hernia  Diagnosis Additional Information: No value filed.    Anesthesia Type:   General     Note:  Airway :Face Mask  Patient transferred to:PACU  Comments: Obdulia Report: Identifed the Patient, Identified the Reponsible Provider, Reviewed the pertinent medical history, Discussed the surgical course, Reviewed Intra-OP anesthesia mangement and issues during anesthesia, Set expectations for post-procedure period and Allowed opportunity for questions and acknowledgement of understanding      Vitals: (Last set prior to Anesthesia Care Transfer)    CRNA VITALS  11/1/2019 0858 - 11/1/2019 0932      11/1/2019             NIBP:  106/77    NIBP Mean:  89                Electronically Signed By: CIRA Coyle CRNA  November 1, 2019  9:32 AM

## 2019-11-01 NOTE — BRIEF OP NOTE
[facility]    Brief Operative Note    Pre-operative diagnosis: umbilical hernia  Post-operative diagnosis Umbilical hernia    Procedure: Procedure(s):  Open umbilical hernia repair with mesh  Surgeon: Surgeon(s) and Role:     * Tung Tellez MD - Primary     * Brandi Burns PA-C  Anesthesia: General   Estimated blood loss: Less than 10 ml  Drains: None  Specimens: * No specimens in log *  Findings:   2cm primary defect contianing incarcerated omentum; repaired with 6.4cm Ventralex underlay patch..  Complications: None.  Implants:   Implant Name Type Inv. Item Serial No.  Lot No. LRB No. Used   MESH VENTRALEX HERNIA 2.5&quot; [place] [employer] W/STRAP [id number] Mesh MESH VENTRALEX HERNIA 2.5&quot; [place] [employer] W/STRAP [id number]  CR [facility] [id number] N/A 1

## 2019-11-04 LAB — INTERPRETATION ECG - MUSE: NORMAL

## 2020-02-26 ENCOUNTER — HOSPITAL ENCOUNTER (INPATIENT)
Facility: CLINIC | Age: 65
LOS: 2 days | Discharge: HOME | DRG: 392 | End: 2020-02-28
Attending: INTERNAL MEDICINE | Admitting: INTERNAL MEDICINE
Payer: MEDICARE

## 2020-02-26 ENCOUNTER — HOSPITAL ENCOUNTER (EMERGENCY)
Facility: CLINIC | Age: 65
Discharge: ANOTHER HOSPITAL | End: 2020-02-26
Attending: EMERGENCY MEDICINE | Admitting: EMERGENCY MEDICINE
Payer: MEDICARE

## 2020-02-26 ENCOUNTER — APPOINTMENT (OUTPATIENT)
Dept: CT IMAGING | Facility: CLINIC | Age: 65
End: 2020-02-26
Attending: EMERGENCY MEDICINE
Payer: MEDICARE

## 2020-02-26 VITALS
SYSTOLIC BLOOD PRESSURE: 124 MMHG | OXYGEN SATURATION: 99 % | TEMPERATURE: 97.1 F | BODY MASS INDEX: 36.16 KG/M2 | WEIGHT: 245 LBS | RESPIRATION RATE: 18 BRPM | HEART RATE: 86 BPM | DIASTOLIC BLOOD PRESSURE: 91 MMHG

## 2020-02-26 DIAGNOSIS — K57.92 ACUTE DIVERTICULITIS: Primary | ICD-10-CM

## 2020-02-26 DIAGNOSIS — K57.20 DIVERTICULITIS OF LARGE INTESTINE WITH PERFORATION, UNSPECIFIED BLEEDING STATUS: Primary | ICD-10-CM

## 2020-02-26 LAB
ABO + RH BLD: NORMAL
ABO + RH BLD: NORMAL
ALBUMIN SERPL-MCNC: 3.2 G/DL (ref 3.4–5)
ALBUMIN UR-MCNC: 10 MG/DL
ALP SERPL-CCNC: 75 U/L (ref 40–150)
ALT SERPL W P-5'-P-CCNC: 29 U/L (ref 0–70)
ANION GAP SERPL CALCULATED.3IONS-SCNC: 7 MMOL/L (ref 3–14)
APPEARANCE UR: CLEAR
AST SERPL W P-5'-P-CCNC: 17 U/L (ref 0–45)
BASOPHILS # BLD AUTO: 0 10E9/L (ref 0–0.2)
BASOPHILS NFR BLD AUTO: 0.2 %
BILIRUB DIRECT SERPL-MCNC: 0.1 MG/DL (ref 0–0.2)
BILIRUB SERPL-MCNC: 0.6 MG/DL (ref 0.2–1.3)
BILIRUB UR QL STRIP: NEGATIVE
BLD GP AB SCN SERPL QL: NORMAL
BLOOD BANK CMNT PATIENT-IMP: NORMAL
BUN SERPL-MCNC: 12 MG/DL (ref 7–30)
CALCIUM SERPL-MCNC: 8.8 MG/DL (ref 8.5–10.1)
CHLORIDE SERPL-SCNC: 102 MMOL/L (ref 94–109)
CO2 SERPL-SCNC: 23 MMOL/L (ref 20–32)
COLOR UR AUTO: ABNORMAL
CREAT SERPL-MCNC: 1.14 MG/DL (ref 0.66–1.25)
DIFFERENTIAL METHOD BLD: ABNORMAL
EOSINOPHIL # BLD AUTO: 0 10E9/L (ref 0–0.7)
EOSINOPHIL NFR BLD AUTO: 0.1 %
ERYTHROCYTE [DISTWIDTH] IN BLOOD BY AUTOMATED COUNT: 12.8 % (ref 10–15)
GFR SERPL CREATININE-BSD FRML MDRD: 67 ML/MIN/{1.73_M2}
GLUCOSE SERPL-MCNC: 134 MG/DL (ref 70–99)
GLUCOSE UR STRIP-MCNC: NEGATIVE MG/DL
HCT VFR BLD AUTO: 38.3 % (ref 40–53)
HGB BLD-MCNC: 12.2 G/DL (ref 13.3–17.7)
HGB UR QL STRIP: NEGATIVE
IMM GRANULOCYTES # BLD: 0.1 10E9/L (ref 0–0.4)
IMM GRANULOCYTES NFR BLD: 0.7 %
KETONES UR STRIP-MCNC: NEGATIVE MG/DL
LACTATE BLD-SCNC: 0.8 MMOL/L (ref 0.7–2)
LEUKOCYTE ESTERASE UR QL STRIP: NEGATIVE
LIPASE SERPL-CCNC: 115 U/L (ref 73–393)
LYMPHOCYTES # BLD AUTO: 0.8 10E9/L (ref 0.8–5.3)
LYMPHOCYTES NFR BLD AUTO: 5 %
MCH RBC QN AUTO: 28.8 PG (ref 26.5–33)
MCHC RBC AUTO-ENTMCNC: 31.9 G/DL (ref 31.5–36.5)
MCV RBC AUTO: 91 FL (ref 78–100)
MONOCYTES # BLD AUTO: 1.2 10E9/L (ref 0–1.3)
MONOCYTES NFR BLD AUTO: 7.2 %
NEUTROPHILS # BLD AUTO: 14.1 10E9/L (ref 1.6–8.3)
NEUTROPHILS NFR BLD AUTO: 86.8 %
NITRATE UR QL: NEGATIVE
NRBC # BLD AUTO: 0 10*3/UL
NRBC BLD AUTO-RTO: 0 /100
PH UR STRIP: 6.5 PH (ref 5–7)
PLATELET # BLD AUTO: 205 10E9/L (ref 150–450)
POTASSIUM SERPL-SCNC: 3.9 MMOL/L (ref 3.4–5.3)
PROT SERPL-MCNC: 7.9 G/DL (ref 6.8–8.8)
RBC # BLD AUTO: 4.23 10E12/L (ref 4.4–5.9)
RBC #/AREA URNS AUTO: 1 /HPF (ref 0–2)
SODIUM SERPL-SCNC: 132 MMOL/L (ref 133–144)
SOURCE: ABNORMAL
SP GR UR STRIP: 1.01 (ref 1–1.03)
SPECIMEN EXP DATE BLD: NORMAL
UROBILINOGEN UR STRIP-MCNC: NORMAL MG/DL (ref 0–2)
WBC # BLD AUTO: 16.3 10E9/L (ref 4–11)
WBC #/AREA URNS AUTO: <1 /HPF (ref 0–5)

## 2020-02-26 PROCEDURE — 86850 RBC ANTIBODY SCREEN: CPT | Performed by: EMERGENCY MEDICINE

## 2020-02-26 PROCEDURE — 81001 URINALYSIS AUTO W/SCOPE: CPT | Performed by: EMERGENCY MEDICINE

## 2020-02-26 PROCEDURE — 74177 CT ABD & PELVIS W/CONTRAST: CPT

## 2020-02-26 PROCEDURE — 99223 1ST HOSP IP/OBS HIGH 75: CPT | Mod: AI | Performed by: PHYSICIAN ASSISTANT

## 2020-02-26 PROCEDURE — 83690 ASSAY OF LIPASE: CPT | Performed by: EMERGENCY MEDICINE

## 2020-02-26 PROCEDURE — 25000128 H RX IP 250 OP 636: Performed by: EMERGENCY MEDICINE

## 2020-02-26 PROCEDURE — 80048 BASIC METABOLIC PNL TOTAL CA: CPT | Performed by: EMERGENCY MEDICINE

## 2020-02-26 PROCEDURE — 36415 COLL VENOUS BLD VENIPUNCTURE: CPT | Performed by: PHYSICIAN ASSISTANT

## 2020-02-26 PROCEDURE — 25000125 ZZHC RX 250: Performed by: EMERGENCY MEDICINE

## 2020-02-26 PROCEDURE — 12000000 ZZH R&B MED SURG/OB

## 2020-02-26 PROCEDURE — 99285 EMERGENCY DEPT VISIT HI MDM: CPT | Mod: 25

## 2020-02-26 PROCEDURE — 87040 BLOOD CULTURE FOR BACTERIA: CPT | Performed by: EMERGENCY MEDICINE

## 2020-02-26 PROCEDURE — 25800030 ZZH RX IP 258 OP 636: Performed by: PHYSICIAN ASSISTANT

## 2020-02-26 PROCEDURE — 86901 BLOOD TYPING SEROLOGIC RH(D): CPT | Performed by: EMERGENCY MEDICINE

## 2020-02-26 PROCEDURE — 36415 COLL VENOUS BLD VENIPUNCTURE: CPT | Performed by: EMERGENCY MEDICINE

## 2020-02-26 PROCEDURE — 86900 BLOOD TYPING SEROLOGIC ABO: CPT | Performed by: EMERGENCY MEDICINE

## 2020-02-26 PROCEDURE — 85025 COMPLETE CBC W/AUTO DIFF WBC: CPT | Performed by: EMERGENCY MEDICINE

## 2020-02-26 PROCEDURE — 83605 ASSAY OF LACTIC ACID: CPT | Performed by: EMERGENCY MEDICINE

## 2020-02-26 PROCEDURE — 80076 HEPATIC FUNCTION PANEL: CPT | Performed by: PHYSICIAN ASSISTANT

## 2020-02-26 PROCEDURE — 25800030 ZZH RX IP 258 OP 636: Performed by: EMERGENCY MEDICINE

## 2020-02-26 PROCEDURE — 96365 THER/PROPH/DIAG IV INF INIT: CPT | Mod: 59

## 2020-02-26 PROCEDURE — 25000128 H RX IP 250 OP 636: Performed by: PHYSICIAN ASSISTANT

## 2020-02-26 RX ORDER — ONDANSETRON 2 MG/ML
4 INJECTION INTRAMUSCULAR; INTRAVENOUS EVERY 8 HOURS PRN
Status: DISCONTINUED | OUTPATIENT
Start: 2020-02-26 | End: 2020-02-26 | Stop reason: HOSPADM

## 2020-02-26 RX ORDER — POLYETHYLENE GLYCOL 3350 17 G/17G
17 POWDER, FOR SOLUTION ORAL DAILY PRN
Status: DISCONTINUED | OUTPATIENT
Start: 2020-02-26 | End: 2020-02-28 | Stop reason: HOSPADM

## 2020-02-26 RX ORDER — HYDROMORPHONE HYDROCHLORIDE 1 MG/ML
0.5 INJECTION, SOLUTION INTRAMUSCULAR; INTRAVENOUS; SUBCUTANEOUS
Status: DISCONTINUED | OUTPATIENT
Start: 2020-02-26 | End: 2020-02-26 | Stop reason: HOSPADM

## 2020-02-26 RX ORDER — ONDANSETRON 4 MG/1
4 TABLET, ORALLY DISINTEGRATING ORAL EVERY 6 HOURS PRN
Status: DISCONTINUED | OUTPATIENT
Start: 2020-02-26 | End: 2020-02-28 | Stop reason: HOSPADM

## 2020-02-26 RX ORDER — AMOXICILLIN 250 MG
2 CAPSULE ORAL 2 TIMES DAILY PRN
Status: DISCONTINUED | OUTPATIENT
Start: 2020-02-26 | End: 2020-02-28 | Stop reason: HOSPADM

## 2020-02-26 RX ORDER — BISACODYL 10 MG
10 SUPPOSITORY, RECTAL RECTAL DAILY PRN
Status: DISCONTINUED | OUTPATIENT
Start: 2020-02-26 | End: 2020-02-28 | Stop reason: HOSPADM

## 2020-02-26 RX ORDER — PROCHLORPERAZINE MALEATE 5 MG
5 TABLET ORAL EVERY 6 HOURS PRN
Status: DISCONTINUED | OUTPATIENT
Start: 2020-02-26 | End: 2020-02-28 | Stop reason: HOSPADM

## 2020-02-26 RX ORDER — PROCHLORPERAZINE 25 MG
12.5 SUPPOSITORY, RECTAL RECTAL EVERY 12 HOURS PRN
Status: DISCONTINUED | OUTPATIENT
Start: 2020-02-26 | End: 2020-02-28 | Stop reason: HOSPADM

## 2020-02-26 RX ORDER — PIPERACILLIN SODIUM, TAZOBACTAM SODIUM 3; .375 G/15ML; G/15ML
3.38 INJECTION, POWDER, LYOPHILIZED, FOR SOLUTION INTRAVENOUS EVERY 6 HOURS
Status: DISCONTINUED | OUTPATIENT
Start: 2020-02-26 | End: 2020-02-28 | Stop reason: HOSPADM

## 2020-02-26 RX ORDER — ONDANSETRON 2 MG/ML
4 INJECTION INTRAMUSCULAR; INTRAVENOUS EVERY 6 HOURS PRN
Status: DISCONTINUED | OUTPATIENT
Start: 2020-02-26 | End: 2020-02-28 | Stop reason: HOSPADM

## 2020-02-26 RX ORDER — ACETAMINOPHEN 650 MG/1
650 SUPPOSITORY RECTAL EVERY 4 HOURS PRN
Status: DISCONTINUED | OUTPATIENT
Start: 2020-02-26 | End: 2020-02-28 | Stop reason: HOSPADM

## 2020-02-26 RX ORDER — HYDROMORPHONE HYDROCHLORIDE 1 MG/ML
.3-.5 INJECTION, SOLUTION INTRAMUSCULAR; INTRAVENOUS; SUBCUTANEOUS
Status: DISCONTINUED | OUTPATIENT
Start: 2020-02-26 | End: 2020-02-28 | Stop reason: HOSPADM

## 2020-02-26 RX ORDER — ACETAMINOPHEN 325 MG/1
650 TABLET ORAL EVERY 4 HOURS PRN
Status: DISCONTINUED | OUTPATIENT
Start: 2020-02-26 | End: 2020-02-28 | Stop reason: HOSPADM

## 2020-02-26 RX ORDER — AMOXICILLIN 250 MG
1 CAPSULE ORAL 2 TIMES DAILY PRN
Status: DISCONTINUED | OUTPATIENT
Start: 2020-02-26 | End: 2020-02-28 | Stop reason: HOSPADM

## 2020-02-26 RX ORDER — LIDOCAINE 40 MG/G
CREAM TOPICAL
Status: DISCONTINUED | OUTPATIENT
Start: 2020-02-26 | End: 2020-02-28 | Stop reason: HOSPADM

## 2020-02-26 RX ORDER — SODIUM CHLORIDE, SODIUM LACTATE, POTASSIUM CHLORIDE, CALCIUM CHLORIDE 600; 310; 30; 20 MG/100ML; MG/100ML; MG/100ML; MG/100ML
INJECTION, SOLUTION INTRAVENOUS CONTINUOUS
Status: DISCONTINUED | OUTPATIENT
Start: 2020-02-26 | End: 2020-02-28 | Stop reason: HOSPADM

## 2020-02-26 RX ORDER — NALOXONE HYDROCHLORIDE 0.4 MG/ML
.1-.4 INJECTION, SOLUTION INTRAMUSCULAR; INTRAVENOUS; SUBCUTANEOUS
Status: DISCONTINUED | OUTPATIENT
Start: 2020-02-26 | End: 2020-02-28 | Stop reason: HOSPADM

## 2020-02-26 RX ORDER — OXYCODONE HYDROCHLORIDE 5 MG/1
5-10 TABLET ORAL
Status: DISCONTINUED | OUTPATIENT
Start: 2020-02-26 | End: 2020-02-28 | Stop reason: HOSPADM

## 2020-02-26 RX ORDER — IOPAMIDOL 755 MG/ML
500 INJECTION, SOLUTION INTRAVASCULAR ONCE
Status: COMPLETED | OUTPATIENT
Start: 2020-02-26 | End: 2020-02-26

## 2020-02-26 RX ADMIN — PIPERACILLIN AND TAZOBACTAM 3.38 G: 3; .375 INJECTION, POWDER, FOR SOLUTION INTRAVENOUS at 18:26

## 2020-02-26 RX ADMIN — TAZOBACTAM SODIUM AND PIPERACILLIN SODIUM 3.38 G: 375; 3 INJECTION, SOLUTION INTRAVENOUS at 12:44

## 2020-02-26 RX ADMIN — SODIUM CHLORIDE, POTASSIUM CHLORIDE, SODIUM LACTATE AND CALCIUM CHLORIDE: 600; 310; 30; 20 INJECTION, SOLUTION INTRAVENOUS at 17:11

## 2020-02-26 RX ADMIN — SODIUM CHLORIDE 65 ML: 9 INJECTION, SOLUTION INTRAVENOUS at 12:01

## 2020-02-26 RX ADMIN — IOPAMIDOL 100 ML: 755 INJECTION, SOLUTION INTRAVENOUS at 12:01

## 2020-02-26 RX ADMIN — SODIUM CHLORIDE 1000 ML: 9 INJECTION, SOLUTION INTRAVENOUS at 15:32

## 2020-02-26 ASSESSMENT — ENCOUNTER SYMPTOMS
CHILLS: 1
ABDOMINAL PAIN: 1
DIFFICULTY URINATING: 0
HEMATURIA: 0
DYSURIA: 0
BLOOD IN STOOL: 0

## 2020-02-26 ASSESSMENT — ACTIVITIES OF DAILY LIVING (ADL): ADLS_ACUITY_SCORE: 17

## 2020-02-26 NOTE — ED PROVIDER NOTES
History     Chief Complaint:  Abdominal Pain    HPI   Fredi Reilly is a 64 year old male who presents with abdominal pain. The patient states that he has had interment lower abdominal pain for the past year. The patient states that over the last few days he has had LLQ pain. He states that he is pain free at rest, but has significant pain with movement. The patient denies any blood in his stools. He reports some night sweats. The patient denies any urinary symptoms. He has not taken any medication for pain.     Allergies:  No Known Drug Allergies     Medications:    Norco    Past Medical History:    GERD  Hiatal hernia   Obesity      Past Surgical History:    Herniorrhaphy umbilical  Knee surgery  Vasectomy  Corrigan teeth extraction     Family History:    Hyperlipidemia - mother  Coronary artery disease - father  Hyperlipidemia - father  Substance abuse - son     Social History:  Negative for tobacco use - former smoker, quit 2009.  Positive for alcohol use - 6 drinks/week.  Negative for drug use.  Marital Status:        Review of Systems   Constitutional: Positive for chills.   Gastrointestinal: Positive for abdominal pain. Negative for blood in stool.   Genitourinary: Negative for difficulty urinating, dysuria and hematuria.   All other systems reviewed and are negative.    Physical Exam     Patient Vitals for the past 24 hrs:   BP Temp Temp src Pulse Heart Rate Resp SpO2 Weight   02/26/20 1230 120/85 -- -- 85 -- -- 100 % --   02/26/20 1145 110/76 -- -- 83 -- -- 98 % --   02/26/20 1130 107/75 -- -- 90 -- -- 99 % --   02/26/20 1051 -- -- -- -- -- -- 98 % --   02/26/20 0939 121/85 97.1  F (36.2  C) Temporal -- 102 18 98 % 111.1 kg (245 lb)     Physical Exam  General: Alert, appears well-developed and well-nourished. Cooperative.     In mild distress  HEENT:  Head:  Atraumatic  Ears:  External ears are normal  Mouth/Throat:  Oropharynx is without erythema or exudate and mucous membranes are moist.    Eyes:   Conjunctivae normal and EOM are normal. No scleral icterus.  CV:  Normal rate, regular rhythm, normal heart sounds and radial pulses are 2+ and symmetric.  No murmur.  Resp:  Breath sounds are clear bilaterally    Non-labored, no retractions or accessory muscle use  GI:  Abdomen is soft, no distension, LLQ tenderness with gaurding. No rebound. No CVA tenderness bilaterally  MS:  Normal range of motion. No edema.    Normal strength in all 4 extremities.     Back atraumatic.    No midline cervical, thoracic, or lumbar tenderness  Skin:  Warm and dry.  No rash or lesions noted.  Neuro:   Alert. Normal strength.GCS: 15   Psych: Normal mood and affect.    Emergency Department Course     Imaging:  Radiology findings were communicated with the patient who voiced understanding of the findings.    CT Abdomen Pelvis w contrast:  1.  Severe acute diverticulitis involving the distal descending colon  and proximal sigmoid colon. A few tiny bubbles of immediately adjacent  extraluminal gas noted indicating ruptured diverticulitis. No abscess  is seen. Small free fluid is noted in this region.  2.  Fatty liver.  3.  Mild distention of the bilateral ureters but no obstructing  etiology is seen.  Reading per radiology    Laboratory:  Laboratory findings were communicated with the patient who voiced understanding of the findings.    UA with micro: protein albumin 10 (A) o/w negative    Lactic Acid: 0.8    Blood cultures: pending X2    CBC: WBC 16.3(H), HGB 12.2(L),     Lipase: 115     Interventions:  1244 Zosyn 3.375 g IV     Emergency Department Course:     Nursing notes and vitals reviewed.    1049 IV was inserted and blood was drawn for laboratory testing, results above.    1053 I performed an exam of the patient as documented above.     1159 The patient was sent for a CT while in the emergency department, results above.     1230 I returned to check on patient.  I personally reviewed the laboratory and imaging  results with the patient and answered all related questions prior to admit.     1243 The patient provided a urine sample here in the emergency department. This was sent for laboratory testing, findings above.    1310 I returned to check on patient.  I updated the patient on the need to transfer due to bed availability.    1347  I spoke with Dr. To of the Hospitalist service from [facility] regarding patient's presentation, findings, and plan of care.    1357 I spoke with Sarah of Colorectal surgery regarding patient's presentation, findings, and plan of care.     1405 I returned to check on patient.     Impression & Plan      Medical Decision Making:  Fredi Reilly is a 64 year old male who presents for evaluation of abdominal pain.  A broad differential diagnosis was considered and CT confirms diverticulitis with rupture.  There is no sign of abscess at this time but due to the rupture would recommend admission and consultation with colorectal surgery.  Patient had blood culture sent and given IV antibiotics here in the emergency department.  There are no signs of shock or bacteremia. Unfortunately, no beds are available at [facility] and patient needed to be transferred to [facility].  Dr. Perez evaluated the patient in ED prior to transfer.  Patient case discussed with Dr. To of the hospital service at [facility] who agreed to admission.  Transferred to [facility] by EMS.    Diagnosis:    ICD-10-CM    1. Diverticulitis of large intestine with perforation, unspecified bleeding status K57.20 Routine UA with microscopic     Lactic acid whole blood     Blood culture     Blood culture     Basic metabolic panel     Basic metabolic panel     Disposition:   Findings and plan explained to the Patient. Patient will be transferred to [facility] via EMS. Discussed the case with Dr. To, who will admit the patient to a monitored bed for further monitoring, evaluation, and treatment.    Ryan  Disclosure:  I, Melinda Andrea, am serving as a scribe at 11:04 AM on 2/26/2020 to document services personally performed by Ray Whitaker MD based on my observations and the provider's statements to me.  [facility] EMERGENCY DEPARTMENT     Ray Whitaker MD  02/26/20 1929

## 2020-02-26 NOTE — H&P
[facility]    History and Physical - Hospitalist Service       Date of Admission:  2/26/2020    Assessment & Plan   Fredi Reilly is a 64 year old male with PMHx of GERD and hiatal hernia who presented to [facility] ED for further evaluation of persistent abdominal pain, found to have severe, acute diverticulitis. Transferred to [facility] as no beds were available at [facility].     Acute diverticulitis, severe, with local perforation: Reports hx of described, recurrent, untreated diverticulitis over the past year. Presenting with progressively worsening LLQ abdominal pain. CT scan showing severe acute diverticulitis involving the distal descending colon and proximal sigmoid colon. A few tiny bubbles of immediately adjacent extraluminal gas noted indicating ruptured diverticulitis. No abscess is seen. Small free fluid is noted in this region. Lactic acid 0.8, lipase 115. WBC 16.3  -- Admit under inpatient status   -- CRS consulted, see consult note by Sarah Miranda PA-C, no emergent surgery indicated  -- IVF with LR at 100 ccs/hr    -- Continue IV Zosyn   -- Analgesic and antiemetic regimen ordered   -- NPO status  -- Serial abdominal exams     Hyponatremia: Sodium 132 on admission in the setting of poor PO intake   -- IVF as above   -- BMP in the AM     Mild ureteral distention, bilateral: UA unremarkable.   -- Monitor for urinary retention    Fatty liver disease: BMI 36.16  -- Check LFTs  -- Diet and lifestyle modification encouraged     Normocytic anemia: No prior Hgb for comparison. No active signs of bleeding, pt denies melena, hematochezia, hematemesis, hemoptysis.   -- Monitor     Recent Labs   Lab 02/26/20  1049   HGB 12.2*        Diet: NPO   DVT Prophylaxis: Pneumatic Compression Devices  Sanchez Catheter: not present  Code Status: Full Code     Disposition Plan   Expected discharge: 2 - 3 days, recommended to prior living arrangement once surgical game plan confirmed..  Entered: Meaghan  Tamar Chung PA-C 02/26/2020, 4:54 PM     The patient's care was discussed with the Attending Physician, Dr. Quinn , Bedside Nurse and Patient.    Meaghan Chung PA-C  [facility]    ______________________________________________________________________    Chief Complaint   Abdominal pain     History is obtained from the patient and chart review.     History of Present Illness   Fredi Reilly is a 64 year old male with PMHx of GERD and hiatal hernia who presented to [facility] ED for further evaluation of persistent abdominal pain, found to have severe, acute diverticulitis. Transferred to [facility] as no beds were available at [facility].     Presents with about 8 days of progressively worsening LLQ abdominal pain.  He has had many episodes of LLQ abdominal pain over the past year.  Usually these episodes last 3-4 days and then resolve completely.  Notes associated constipation followed by diarrhea at the resolution of pain.  No prior evaluation. Presenting to the ED as pain has progressively worsened.  No pain when resting, but any morvement causes significant LLQ pain that is sharp. Radiates to the midline.  He has been having some loose stools for the past few days.  Continuing to pass flatus. Reports nausea but no vomiting.  Has been having sweats and chills.  He has not taken his temperature.  He denies dysuria but feels like he has not been able to urinate much the past 2 days but did void well in the ER a few hours ago.  He has been eating mostly soup for the past few days and has been drinking a lot of water. He typically has a formed bowel movement every day.  Last colonoscopy was in 2017 and one inflammatory polyp was removed.  He states at his prior colonoscopy about 10 years before that, there were no polyps.  He has had a few family members who have needed sigmoid resections for diverticulitis.      In the ER, pt was assessed by Dr. Whitaker who obtained  basic labs and imaging. Sodium 132, remainder of BMP unremarkable, lactic acid 0.8, lipase 115. WBC 16.3, Hgb 12.2. UA unremarkable. Blood cultures ordered.    CT A/P:   Severe acute diverticulitis involving the distal descending colon  and proximal sigmoid colon. A few tiny bubbles of immediately adjacent  extraluminal gas noted indicating ruptured diverticulitis. No abscess  is seen. Small free fluid is noted in this region.  2.  Fatty liver.  3.  Mild distention of the bilateral ureters but no obstructing  etiology is seen.    Pt received a 1 L bolus IVF and one dose of Zosyn prior to transfer. On my interview, pt reports pain is down to a 2/10. Most of the pain is exacerbated with movement. Afebrile. Denies chest pain or SOB. No recent travel, antibiotic use, or exposure to sick contacts with diarrhea. Pt has not had a BM today. Denies melena, hematochezia. No medication use. With prior described bouts of diverticulitis over the past year, pt was never treated with antibiotics, he never sought medical attention.     Review of Systems    The 10 point Review of Systems is negative other than noted in the HPI.    Past Medical History    I have reviewed this patient's medical history and updated it with pertinent information if needed.   Past Medical History:   Diagnosis Date     Gastroesophageal reflux disease      Hiatal hernia      Past Surgical History   I have reviewed this patient's surgical history and updated it with pertinent information if needed.  Past Surgical History:   Procedure Laterality Date     HERNIORRHAPHY UMBILICAL N/A 11/1/2019    Procedure: Open umbilical hernia repair with mesh;  Surgeon: Tung Tellez MD;  Location:  OR     ORTHOPEDIC SURGERY  05/2015    knee     Meniscus surgery, right knee.     Social History   I have reviewed this patient's social history and updated it with pertinent information if needed.  Social History     Tobacco Use     Smoking status: Former Smoker      "Packs/day: 0.00     Types: Cigarettes     Smokeless tobacco: Former User     Quit date: 05/2009   Substance Use Topics     Alcohol use: Yes     Comment: 6 per wk     Drug use: Never     Lives in [place] with wife and son. 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 Prior smoker, quit 11 years ago, smoked cigars \"heavily.\" Drinks a total of 6-8 beers/week. No recreational drug use.     Family History   I have reviewed this patient's family history and updated it with pertinent information if needed.   Family History   Problem Relation Age of Onset     Hyperlipidemia Mother      Coronary Artery Disease Father      Hyperlipidemia Father      Colon Cancer Maternal Grandmother      Cerebrovascular Disease Maternal Grandfather      Coronary Artery Disease Paternal Grandmother      Coronary Artery Disease Paternal Grandfather      Substance Abuse Son      Reports mother, aunt, and uncle ended up with colon surgery (cause for surgery unclear). Maternal grandmother with colon cancer.     Prior to Admission Medications   None     Allergies   No Known Allergies    Physical Exam   Vital Signs: Temp: 98.7  F (37.1  C) Temp src: Oral BP: 112/84   Heart Rate: 85 Resp: 18        Weight: 0 lbs 0 oz    CONSTITUTIONAL: Pt laying in bed, dressed in hospital garb. Appears comfortable. Cooperative with interview.  HEENT: Normocephalic, atraumatic. Negative for conjunctival redness or scleral icterus.    CARDIOVASCULAR: RRR, no murmurs, rubs, or extra heart sounds appreciated. Pulses +2/4 and regular in upper and lower extremities, bilaterally.   RESPIRATORY: No increased work of breathing.  CTA, bilat; no wheezes, rales, or rhonchi appreciated.  GASTROINTESTINAL:  Abdomen soft, distended. BS hypoactive in all four quadrants. Significant tenderness to palpation. Upon palpating epigastric region and LLQ, pt has exquisite pain in LLQ.   MUSCULOSKELETAL: No gross deformities noted. Normal muscle tone. "   HEMATOLOGIC/LYMPHATIC/IMMUNOLOGIC: Negative for lower extremity edema, bilaterally.  NEUROLOGIC: Alert and oriented to person, place, and time.  strength intact. No focal neuro deficits.   SKIN: Warm, dry, intact. No jaundice noted. Negative for suspicious lesions, rashes, bruising, open sores or abrasions.     Data   Data reviewed today: I reviewed all medications, new labs and imaging results over the last 24 hours. I personally reviewed the abdominal CT image(s) showing see above .    Recent Labs   Lab 02/26/20  1049 02/26/20  1047   WBC 16.3*  --    HGB 12.2*  --    MCV 91  --      --    NA  --  132*   POTASSIUM  --  3.9   CHLORIDE  --  102   CO2  --  23   BUN  --  12   CR  --  1.14   ANIONGAP  --  7   MUSHTAQ  --  8.8   GLC  --  134*   LIPASE  --  115     Recent Results (from the past 24 hour(s))   CT Abdomen Pelvis w Contrast    Narrative    CT ABDOMEN AND PELVIS WITH CONTRAST 2/26/2020 12:12 PM    CLINICAL HISTORY: Abdominal pain, diverticulitis suspected.    TECHNIQUE: CT scan of the abdomen and pelvis was performed following  injection of IV contrast. Multiplanar reformats were obtained. Dose  reduction techniques were used.  CONTRAST: 100 mL [id number]    COMPARISON: None.    FINDINGS:   LOWER CHEST: Normal.    HEPATOBILIARY: Fatty liver. No acute abnormality. Normal appearance of  the gallbladder.    PANCREAS: Normal.    SPLEEN: Normal.    ADRENAL GLANDS: Normal.    KIDNEYS/BLADDER: Mild distention of the bilateral ureters but no  obstructing etiology can be seen. Otherwise unremarkable kidneys.    BOWEL: Severe acute diverticulitis at the distal descending colon and  proximal sigmoid colon series 4 image 168 and adjacent images. Tiny  bubbles of gas at the involved diverticulum noted on series 4 image  165 that may be just extraluminal. There is no large free air. No  bowel obstruction. Prominent edema and trace fluid at the left lower  quadrant, but no loculated abscess.    PELVIC ORGANS:  Normal.    ADDITIONAL FINDINGS: Vascular calcifications.    MUSCULOSKELETAL: Normal.      Impression    IMPRESSION:   1.  Severe acute diverticulitis involving the distal descending colon  and proximal sigmoid colon. A few tiny bubbles of immediately adjacent  extraluminal gas noted indicating ruptured diverticulitis. No abscess  is seen. Small free fluid is noted in this region.  2.  Fatty liver.  3.  Mild distention of the bilateral ureters but no obstructing  etiology is seen.    CADE BOWSER MD

## 2020-02-26 NOTE — CONSULTS
"[facility]  Colon and Rectal Surgery Consult Note  Name: Fredi Reilly    MRN: [id number]  YOB: 1955    Age: 64 year old  Date of admission: 2/26/2020  Primary care provider: Dereje Camacho     Requesting Physician:  Dr. Whitaker  Reason for consult:  Diverticulitis            History of Present Illness:   Fredi Reilly is a 64 year old male, seen at the request of Dr. Whitaekr, who presents with about 8 days of progressively worsening LLQ abdominal pain.  He has had many episodes of LLQ abdominal pain over the past year.  Usually these episodes last 3-4 days and then resolve completely.  There is typically associated constipation followed by diarrhea at the resolution of pain.  He has never been evaluated for this and reports that he \"tries to stay away from the doctors\".  This current episode started about 8 days ago.  The pain has been worsening.  There is no pain when resting but any morvement causes significant LLQ pain that is sharp.  It radiates to the midline.  He has been having some loose stools for the past few days.  He is continuing to pass flatus.  He reports nausea but no vomiting.  Has been having sweats and chills.  He has not taken his temperature.  He denies dysuria but feels like he has not been able to urinate much the past 2 days but did void well in the ER a few hours ago.  He has been eating mostly soup for the past few days and has been drinking a lot of water. He typically has a formed bowel movement every day.  Last colonoscopy was in 2017 and one inflammatory polyp was removed.  He states at his prior colonoscopy about 10 years before that, there were no polyps.  He has had a few family members who have needed sigmoid resections for diverticulitis.      In the ER, he has a leukocytosis of 16.3. Lactic acid was 0.8.  Urinalysis was unremarkable.    CT of the abdomen and pelvis shows severe acute diverticulitis of the distal descending colon and proximal " sigmoid colon with a few tiny bubbles of immediately adjacent extraluminal gas.  No abscess.  No large free air.       He has received some pain medication and feels a bit more comfortable.      Colonoscopy History:  2017    Surgical History: umbilical hernia repair 11/2019            Past Medical History:     Past Medical History:   Diagnosis Date     Gastroesophageal reflux disease      Hiatal hernia              Past Surgical History:     Past Surgical History:   Procedure Laterality Date     HERNIORRHAPHY UMBILICAL N/A 11/1/2019    Procedure: Open umbilical hernia repair with mesh;  Surgeon: Tung Tellze MD;  Location: [facility] OR     ORTHOPEDIC SURGERY  05/2015    knee               Social History:     Social History     Tobacco Use     Smoking status: Former Smoker     Packs/day: 0.00     Types: Cigarettes     Smokeless tobacco: Former User     Quit date: 05/2009   Substance Use Topics     Alcohol use: Yes     Comment: 6 per wk             Family History:     Family History   Problem Relation Age of Onset     Hyperlipidemia Mother      Coronary Artery Disease Father      Hyperlipidemia Father      Colon Cancer Maternal Grandmother      Cerebrovascular Disease Maternal Grandfather      Coronary Artery Disease Paternal Grandmother      Coronary Artery Disease Paternal Grandfather      Substance Abuse Son              Allergies:   No Known Allergies          Medications:             Review of Systems:   A comprehensive greater than 10 system review of systems was carried out.  Pertinent positives and negatives are noted above.  Otherwise negative for contributory info.            Physical Exam:     Blood pressure 133/81, pulse 88, temperature 97.1  F (36.2  C), temperature source Temporal, resp. rate 18, weight 111.1 kg (245 lb), SpO2 100 %.  No intake or output data in the 24 hours ending 02/26/20 1505    EXAM:  GEN: Awake alert and oriented, appears stated age; sitting up in bed, appears comfortable    PULM: Non-labored breathing with normal respiratory effort  ABD: Soft, non-distended, tender in LLQ with some rebound tenderness  NEURO: CN II-XII grossly intact  MSK: extremeties with no clubbing, cyanosis or edema  PSYCH: responsive, alert, cooperative; oriented x3; appropriate mood and affect  EXT/SKIN: inspection reveals no rashes, lesions or ulcers, normal coloring         Data Reviewed:     Recent Results (from the past 24 hour(s))   CT Abdomen Pelvis w Contrast    Narrative    CT ABDOMEN AND PELVIS WITH CONTRAST 2/26/2020 12:12 PM    CLINICAL HISTORY: Abdominal pain, diverticulitis suspected.    TECHNIQUE: CT scan of the abdomen and pelvis was performed following  injection of IV contrast. Multiplanar reformats were obtained. Dose  reduction techniques were used.  CONTRAST: 100 mL Isovue-370    COMPARISON: None.    FINDINGS:   LOWER CHEST: Normal.    HEPATOBILIARY: Fatty liver. No acute abnormality. Normal appearance of  the gallbladder.    PANCREAS: Normal.    SPLEEN: Normal.    ADRENAL GLANDS: Normal.    KIDNEYS/BLADDER: Mild distention of the bilateral ureters but no  obstructing etiology can be seen. Otherwise unremarkable kidneys.    BOWEL: Severe acute diverticulitis at the distal descending colon and  proximal sigmoid colon series 4 image 168 and adjacent images. Tiny  bubbles of gas at the involved diverticulum noted on series 4 image  165 that may be just extraluminal. There is no large free air. No  bowel obstruction. Prominent edema and trace fluid at the left lower  quadrant, but no loculated abscess.    PELVIC ORGANS: Normal.    ADDITIONAL FINDINGS: Vascular calcifications.    MUSCULOSKELETAL: Normal.      Impression    IMPRESSION:   1.  Severe acute diverticulitis involving the distal descending colon  and proximal sigmoid colon. A few tiny bubbles of immediately adjacent  extraluminal gas noted indicating ruptured diverticulitis. No abscess  is seen. Small free fluid is noted in this  region.  2.  Fatty liver.  3.  Mild distention of the bilateral ureters but no obstructing  etiology is seen.    CADE BOWSER MD       Recent Labs   Lab 02/26/20  1049   WBC 16.3*   HGB 12.2*   HCT 38.3*   MCV 91        Recent Labs   Lab 02/26/20  1047   *   POTASSIUM 3.9   CHLORIDE 102   CO2 23   ANIONGAP 7   *   BUN 12   CR 1.14   GFRESTIMATED 67   GFRESTBLACK 78   MUSHTAQ 8.8         Assessment and Plan:   Riaz is a 65yo male with acute diverticulitis.  He will be admitted but there are no beds here at [facility] and will be transferred to [facility] sometime this evening.  Conservative measures were recommended and discussed with the patient.  Would recommend NPO.  IV antibiotics.  Pain control.  We discussed the possible need for a sigmoid resection if he moody snot improve or if he worsens clinically.  I have talked with the colorectal surgeon on call at [facility] and they are aware he will be transferring at some point.     Plan:  1. Surgery: No emergent surgery indicated  2. Diet: NPO  3. IV Fluids: would recommend IV fluids   4. Antibiotics:  Continue Zosyn  5. Medications: Continue home meds  6. I&O s:  strict I&O s  7. Labs:   - Reviewed: by myself  - Ordered: none   8. Imaging:   - Dr. Perez and myself have personally viewed: CT abd/pelvis  - Ordered:  none  9. Activity: ambulate as tolerated, encourage OOB  10. DVT prophylaxis: SCD s  11. This plan has been discussed with Dr. Perez    Patient specific identified risk factors considered as part of today s evaluation include: none    Additional history obtained from chart review.  Time spent on consultation: 30 minutes, greater than 50 percent of the total encounter time is spent in counseling and/or coordination of care          Sarah Miranda PA-C  [facility]  Phone:  [phone]

## 2020-02-26 NOTE — PHARMACY-ADMISSION MEDICATION HISTORY
Admission medication history interview status for this patient is complete. See [facility] admission navigator for allergy information, prior to admission medications and immunization status.     Medication history interview source(s):Patient  Medication history resources (including written lists, pill bottles, clinic record):None    Changes made to PTA medication list:  Added: none  Deleted: none  Changed: none     Actions taken by pharmacist (provider contacted, etc):None     Additional medication history information: None    Medication reconciliation/reorder completed by provider prior to medication history? No    Prior to Admission medications    Not on File

## 2020-02-26 NOTE — ED TRIAGE NOTES
Pt arrives with LLQ abd pain ongoing 1 wk, passing gas but hasn't had regular BM for 5 days. He reports over the past year this seems to happen regularly but usually doesn't last this long. Pt has not seen MD for symptoms. Mild nausea no vomiting. ABCs intact. A/ox 3.

## 2020-02-27 LAB
ANION GAP SERPL CALCULATED.3IONS-SCNC: 5 MMOL/L (ref 3–14)
BASOPHILS # BLD AUTO: 0 10E9/L (ref 0–0.2)
BASOPHILS NFR BLD AUTO: 0.1 %
BUN SERPL-MCNC: 13 MG/DL (ref 7–30)
CALCIUM SERPL-MCNC: 8.5 MG/DL (ref 8.5–10.1)
CHLORIDE SERPL-SCNC: 106 MMOL/L (ref 94–109)
CO2 SERPL-SCNC: 25 MMOL/L (ref 20–32)
CREAT SERPL-MCNC: 1.22 MG/DL (ref 0.66–1.25)
DIFFERENTIAL METHOD BLD: ABNORMAL
EOSINOPHIL # BLD AUTO: 0.1 10E9/L (ref 0–0.7)
EOSINOPHIL NFR BLD AUTO: 0.8 %
ERYTHROCYTE [DISTWIDTH] IN BLOOD BY AUTOMATED COUNT: 13.1 % (ref 10–15)
GFR SERPL CREATININE-BSD FRML MDRD: 62 ML/MIN/{1.73_M2}
GLUCOSE SERPL-MCNC: 91 MG/DL (ref 70–99)
HCT VFR BLD AUTO: 33.6 % (ref 40–53)
HGB BLD-MCNC: 11 G/DL (ref 13.3–17.7)
IMM GRANULOCYTES # BLD: 0 10E9/L (ref 0–0.4)
IMM GRANULOCYTES NFR BLD: 0.3 %
LYMPHOCYTES # BLD AUTO: 0.9 10E9/L (ref 0.8–5.3)
LYMPHOCYTES NFR BLD AUTO: 7.9 %
MCH RBC QN AUTO: 28.8 PG (ref 26.5–33)
MCHC RBC AUTO-ENTMCNC: 32.7 G/DL (ref 31.5–36.5)
MCV RBC AUTO: 88 FL (ref 78–100)
MONOCYTES # BLD AUTO: 0.9 10E9/L (ref 0–1.3)
MONOCYTES NFR BLD AUTO: 7.7 %
NEUTROPHILS # BLD AUTO: 9.5 10E9/L (ref 1.6–8.3)
NEUTROPHILS NFR BLD AUTO: 83.2 %
NRBC # BLD AUTO: 0 10*3/UL
NRBC BLD AUTO-RTO: 0 /100
PLATELET # BLD AUTO: 207 10E9/L (ref 150–450)
POTASSIUM SERPL-SCNC: 4.2 MMOL/L (ref 3.4–5.3)
RBC # BLD AUTO: 3.82 10E12/L (ref 4.4–5.9)
SODIUM SERPL-SCNC: 136 MMOL/L (ref 133–144)
WBC # BLD AUTO: 11.4 10E9/L (ref 4–11)

## 2020-02-27 PROCEDURE — 25800030 ZZH RX IP 258 OP 636: Performed by: PHYSICIAN ASSISTANT

## 2020-02-27 PROCEDURE — 36415 COLL VENOUS BLD VENIPUNCTURE: CPT | Performed by: PHYSICIAN ASSISTANT

## 2020-02-27 PROCEDURE — 99232 SBSQ HOSP IP/OBS MODERATE 35: CPT | Performed by: INTERNAL MEDICINE

## 2020-02-27 PROCEDURE — 85025 COMPLETE CBC W/AUTO DIFF WBC: CPT | Performed by: PHYSICIAN ASSISTANT

## 2020-02-27 PROCEDURE — 25000128 H RX IP 250 OP 636: Performed by: PHYSICIAN ASSISTANT

## 2020-02-27 PROCEDURE — 12000000 ZZH R&B MED SURG/OB

## 2020-02-27 PROCEDURE — 80048 BASIC METABOLIC PNL TOTAL CA: CPT | Performed by: PHYSICIAN ASSISTANT

## 2020-02-27 RX ADMIN — PIPERACILLIN AND TAZOBACTAM 3.38 G: 3; .375 INJECTION, POWDER, FOR SOLUTION INTRAVENOUS at 06:59

## 2020-02-27 RX ADMIN — SODIUM CHLORIDE, POTASSIUM CHLORIDE, SODIUM LACTATE AND CALCIUM CHLORIDE: 600; 310; 30; 20 INJECTION, SOLUTION INTRAVENOUS at 17:20

## 2020-02-27 RX ADMIN — PIPERACILLIN AND TAZOBACTAM 3.38 G: 3; .375 INJECTION, POWDER, FOR SOLUTION INTRAVENOUS at 19:32

## 2020-02-27 RX ADMIN — PIPERACILLIN AND TAZOBACTAM 3.38 G: 3; .375 INJECTION, POWDER, FOR SOLUTION INTRAVENOUS at 14:18

## 2020-02-27 RX ADMIN — PIPERACILLIN AND TAZOBACTAM 3.38 G: 3; .375 INJECTION, POWDER, FOR SOLUTION INTRAVENOUS at 02:09

## 2020-02-27 RX ADMIN — SODIUM CHLORIDE, POTASSIUM CHLORIDE, SODIUM LACTATE AND CALCIUM CHLORIDE: 600; 310; 30; 20 INJECTION, SOLUTION INTRAVENOUS at 05:06

## 2020-02-27 ASSESSMENT — ACTIVITIES OF DAILY LIVING (ADL)
ADLS_ACUITY_SCORE: 17

## 2020-02-27 NOTE — PLAN OF CARE
"Pt A/O x4. LLQ pain reported as \"much better than yesterday.\" He only complained of pain with palpitation and certain movement. Passing gas, bowel sounds active. 1 small loose BM before bed. Denies nausea. No appetite but also NPO since admission @ 1615 yesterday. VSS on RA. Up independently in room; Voiding adequately. Awaiting surgical vs non-surgical plan.     "

## 2020-02-27 NOTE — PROGRESS NOTES
COLON & RECTAL SURGERY  PROGRESS NOTE    February 27, 2020    SUBJECTIVE:  Feeling better this morning, pain improved. Pain increases with movement. Not taking any pain medication. No nausea/vomiting. Ambulating. +flatus, +loose BM. Asking to drink liquids.    OBJECTIVE:  Temp:  [97.1  F (36.2  C)-99.3  F (37.4  C)] 97.8  F (36.6  C)  Pulse:  [75-90] 75  Heart Rate:  [] 79  Resp:  [16-18] 16  BP: (103-133)/(68-91) 112/72  SpO2:  [61 %-100 %] 95 %  No intake or output data in the 24 hours ending 02/27/20 0824    GENERAL:  Awake, alert, no acute distress, sitting up at side of bed  HEAD: Normocephalic atraumatic  SCLERA: Anicteric  EXTREMITIES: Warm and well perfused  ABDOMEN:  Soft, tender in lower abdomen, non-distended. No guarding, rigidity, or peritoneal signs.     LABS:  Lab Results   Component Value Date    WBC 11.4 02/27/2020     Lab Results   Component Value Date    HGB 11.0 02/27/2020     Lab Results   Component Value Date    HCT 33.6 02/27/2020     Lab Results   Component Value Date     02/27/2020     Last Basic Metabolic Panel:  Lab Results   Component Value Date     02/27/2020      Lab Results   Component Value Date    POTASSIUM 4.2 02/27/2020     Lab Results   Component Value Date    CHLORIDE 106 02/27/2020     Lab Results   Component Value Date    MUSHTAQ 8.5 02/27/2020     Lab Results   Component Value Date    CO2 25 02/27/2020     Lab Results   Component Value Date    BUN 13 02/27/2020     Lab Results   Component Value Date    CR 1.22 02/27/2020     Lab Results   Component Value Date    GLC 91 02/27/2020       ASSESSMENT/PLAN: Riaz is a 63yo male with acute diverticulitis admitted to [facility] as there were no beds at [facility]. Improving with conservative management and bowel rest. AVSS. Leukocytosis decreasing.     1. Clear liquid diet  2. Prn pain meds available  3. Continue IVF  4. Continue IV Zosyn  5. OOB, ambulate    Plan for discharge in 1-2 days pending clinical improvement and  tolerating diet. Will transition to PO antibiotics at discharge for 2 weeks total of antibiotics.     Will discuss with Dr. Perez.    For questions/paging, please contact the CRS office at [phone].    Eliana Boss PA-C  [facility]  Phone: [phone]      Colon and Rectal Surgery Attending Note    Patient seen and examined independently.  Agree with above assessment and plan.  Much better still with focal pain.   No nausea, tolerating clears  abd soft with focal tenderness although better than yesterday.  Plan   Full liquids in AM and likely transition to oral abx tomorrow.  Possible discharge later tomorrow if doing well.    Shi Perez MD  [facility]  Office Phone # [phone]

## 2020-02-27 NOTE — PROGRESS NOTES
[facility]    Hospitalist Progress Note      Assessment & Plan     Fredi Reilly is a 64 year old male with PMHx of GERD and hiatal hernia who presented to [facility] ED for further evaluation of persistent abdominal pain, found to have severe, acute diverticulitis. Transferred to [facility] on 2/26 as no beds were available at [facility].      Acute diverticulitis, severe, with local perforation  Has hx of described, recurrent, untreated diverticulitis over the past year. Presenting with progressively worsening LLQ abdominal pain. CT scan showing severe acute diverticulitis involving the distal descending colon and proximal sigmoid colon w/ ruptured diverticulitis, no abscess, +small free fluid. Lactic acid 0.8, lipase 115. WBC 16.3  - f/u CRS recs. Per consult note by Sarah Miranda PA-C, no emergent surgery indicated  - advance to CLD per Surgery  - IVF  - empiric IV Zosyn  - serial abdominal exams    Hyponatremia: Sodium 132 on admission in the setting of poor PO intake   - IVF as above       Mild ureteral distention, bilateral: UA unremarkable.   - Monitor for urinary retention    Fatty liver disease: BMI 36.16  - f/u LFTs     Normocytic anemia: No prior Hgb for comparison. No active signs of bleeding, pt denies melena, hematochezia, hematemesis, hemoptysis.   - Monitor       DVT Prophylaxis: Pneumatic Compression Devices  Code Status: Full Code  Expected discharge: 1-2 days, pending further improvement in diverticulitis    Mychal Styles MD  Text Page (7am - 6pm, M-F)    Interval History   No fevers, chest pain, SOB, N/V.  Abd pain is well-controlled.    -Data reviewed today: I reviewed all new labs and imaging results over the last 24 hours.    Physical Exam   Temp: 97.8  F (36.6  C) Temp src: Oral BP: 112/72 Pulse: 75 Heart Rate: 79 Resp: 16 SpO2: 95 % O2 Device: None (Room air)    Vitals:    02/27/20 0533   Weight: 112.8 kg (248 lb 10.9 oz)     Vital Signs with Ranges  Temp:  [97.8  F  (36.6  C)-99.3  F (37.4  C)] 97.8  F (36.6  C)  Pulse:  [75-89] 75  Heart Rate:  [79-85] 79  Resp:  [16-18] 16  BP: (103-133)/(68-91) 112/72  SpO2:  [61 %-100 %] 95 %  No intake/output data recorded.    General: well-appearing, NAD  HEENT: NC/AT, moist mucus membranes  Heart: RRR no m/r/g  Lungs: CTAB no crackles or wheezes  Abdomen: NABS, mildly tender to palpation, non-distended; no rebound or guarding; no HSM or masses.  Extremities: warm, well-perfused.  Neuro: CN II-XII grossly intact, moving extremities spontaneously  Psych: normal mood and affect      Medications     lactated ringers 100 mL/hr at 02/27/20 0506       piperacillin-tazobactam  3.375 g Intravenous Q6H     sodium chloride (PF)  3 mL Intracatheter Q8H       Data   Recent Labs   Lab 02/27/20  0638 02/26/20  1653 02/26/20  1049 02/26/20  1047   WBC 11.4*  --  16.3*  --    HGB 11.0*  --  12.2*  --    MCV 88  --  91  --      --  205  --      --   --  132*   POTASSIUM 4.2  --   --  3.9   CHLORIDE 106  --   --  102   CO2 25  --   --  23   BUN 13  --   --  12   CR 1.22  --   --  1.14   ANIONGAP 5  --   --  7   MUSHTAQ 8.5  --   --  8.8   GLC 91  --   --  134*   ALBUMIN  --  3.2*  --   --    PROTTOTAL  --  7.9  --   --    BILITOTAL  --  0.6  --   --    ALKPHOS  --  75  --   --    ALT  --  29  --   --    AST  --  17  --   --    LIPASE  --   --   --  115       Recent Results (from the past 24 hour(s))   CT Abdomen Pelvis w Contrast    Narrative    CT ABDOMEN AND PELVIS WITH CONTRAST 2/26/2020 12:12 PM    CLINICAL HISTORY: Abdominal pain, diverticulitis suspected.    TECHNIQUE: CT scan of the abdomen and pelvis was performed following  injection of IV contrast. Multiplanar reformats were obtained. Dose  reduction techniques were used.  CONTRAST: 100 mL Isovue-370    COMPARISON: None.    FINDINGS:   LOWER CHEST: Normal.    HEPATOBILIARY: Fatty liver. No acute abnormality. Normal appearance of  the gallbladder.    PANCREAS: Normal.    SPLEEN:  Normal.    ADRENAL GLANDS: Normal.    KIDNEYS/BLADDER: Mild distention of the bilateral ureters but no  obstructing etiology can be seen. Otherwise unremarkable kidneys.    BOWEL: Severe acute diverticulitis at the distal descending colon and  proximal sigmoid colon series 4 image 168 and adjacent images. Tiny  bubbles of gas at the involved diverticulum noted on series 4 image  165 that may be just extraluminal. There is no large free air. No  bowel obstruction. Prominent edema and trace fluid at the left lower  quadrant, but no loculated abscess.    PELVIC ORGANS: Normal.    ADDITIONAL FINDINGS: Vascular calcifications.    MUSCULOSKELETAL: Normal.      Impression    IMPRESSION:   1.  Severe acute diverticulitis involving the distal descending colon  and proximal sigmoid colon. A few tiny bubbles of immediately adjacent  extraluminal gas noted indicating ruptured diverticulitis. No abscess  is seen. Small free fluid is noted in this region.  2.  Fatty liver.  3.  Mild distention of the bilateral ureters but no obstructing  etiology is seen.    CADE BOWSER MD

## 2020-02-28 VITALS
BODY MASS INDEX: 36.71 KG/M2 | DIASTOLIC BLOOD PRESSURE: 56 MMHG | HEART RATE: 75 BPM | TEMPERATURE: 98 F | OXYGEN SATURATION: 96 % | SYSTOLIC BLOOD PRESSURE: 102 MMHG | WEIGHT: 248.68 LBS | RESPIRATION RATE: 16 BRPM

## 2020-02-28 PROCEDURE — 25000128 H RX IP 250 OP 636: Performed by: PHYSICIAN ASSISTANT

## 2020-02-28 PROCEDURE — 99238 HOSP IP/OBS DSCHRG MGMT 30/<: CPT | Performed by: INTERNAL MEDICINE

## 2020-02-28 RX ORDER — OXYCODONE HYDROCHLORIDE 5 MG/1
5-10 TABLET ORAL
Qty: 15 TABLET | Refills: 0 | Status: SHIPPED | OUTPATIENT
Start: 2020-02-28 | End: 2023-06-30

## 2020-02-28 RX ORDER — CIPROFLOXACIN 750 MG/1
750 TABLET, FILM COATED ORAL 2 TIMES DAILY
Qty: 26 TABLET | Refills: 0 | Status: SHIPPED | OUTPATIENT
Start: 2020-02-28 | End: 2023-06-30

## 2020-02-28 RX ORDER — METRONIDAZOLE 500 MG/1
500 TABLET ORAL 4 TIMES DAILY
Qty: 52 TABLET | Refills: 0 | Status: SHIPPED | OUTPATIENT
Start: 2020-02-28 | End: 2020-03-12

## 2020-02-28 RX ADMIN — PIPERACILLIN AND TAZOBACTAM 3.38 G: 3; .375 INJECTION, POWDER, FOR SOLUTION INTRAVENOUS at 00:35

## 2020-02-28 RX ADMIN — PIPERACILLIN AND TAZOBACTAM 3.38 G: 3; .375 INJECTION, POWDER, FOR SOLUTION INTRAVENOUS at 06:49

## 2020-02-28 ASSESSMENT — ACTIVITIES OF DAILY LIVING (ADL)
ADLS_ACUITY_SCORE: 13

## 2020-02-28 NOTE — PROGRESS NOTES
COLON & RECTAL SURGERY  PROGRESS NOTE    February 28, 2020    SUBJECTIVE:  Doing well. Feels area of pain is decreasing in size and pain is decreasing. Not taking any pain meds. Tolerating CLD. No n/v. Ambulating. +flatus, +loose BM.    OBJECTIVE:  Temp:  [97.4  F (36.3  C)-97.7  F (36.5  C)] 97.7  F (36.5  C)  Heart Rate:  [70-81] 70  Resp:  [16] 16  BP: ()/(58-75) 90/58  SpO2:  [97 %-98 %] 98 %    Intake/Output Summary (Last 24 hours) at 2/28/2020 0828  Last data filed at 2/27/2020 2322  Gross per 24 hour   Intake 360 ml   Output 2275 ml   Net -1915 ml       GENERAL:  Awake, alert, no acute distress, lying in bed  HEAD: Normocephalic atraumatic  SCLERA: Anicteric  EXTREMITIES: Warm and well perfused  ABDOMEN:  Soft, tender in LLQ, non-distended. No guarding, rigidity, or peritoneal signs.     LABS:  Lab Results   Component Value Date    WBC 11.4 02/27/2020     Lab Results   Component Value Date    HGB 11.0 02/27/2020     Lab Results   Component Value Date    HCT 33.6 02/27/2020     Lab Results   Component Value Date     02/27/2020     Last Basic Metabolic Panel:  Lab Results   Component Value Date     02/27/2020      Lab Results   Component Value Date    POTASSIUM 4.2 02/27/2020     Lab Results   Component Value Date    CHLORIDE 106 02/27/2020     Lab Results   Component Value Date    MUSHTAQ 8.5 02/27/2020     Lab Results   Component Value Date    CO2 25 02/27/2020     Lab Results   Component Value Date    BUN 13 02/27/2020     Lab Results   Component Value Date    CR 1.22 02/27/2020     Lab Results   Component Value Date    GLC 91 02/27/2020       ASSESSMENT/PLAN: Riaz is a 63yo male with acute diverticulitis admitted to [facility] as there were no beds at [facility]. Improving with conservative management and bowel rest. AVSS. ROBF. Plan to discharge later today once tolerating FLD. Transition to PO antibiotics at discharge for total of 2 weeks abx. Should remain on FLD for 1-2 weeks and then follow a low  fiber diet for 4 weeks after. No need for follow up with CRS as patient has up to date colonoscopy and this is first episode of diverticulitis.     1. Full liquid diet  2. PRN pain meds  3. Discontinue IVF  4. OOB, ambulate  5. Discharge today    Plan discussed with Dr. Perez.    For questions/paging, please contact the CRS office at [phone].    Eliana Boss PA-C  [facility]  Phone: [phone]

## 2020-02-28 NOTE — PLAN OF CARE
Alert and orientated. Tolerating diet. No Nausea. Passing small amount flatus. BS active. IV Abx plan to switch over to oral tomorrow. Up independently  in room. Voiding in urinal. Pt educated on diet, and attached handouts in nursing navigator. Possible discharge tomorrow afternoon if tolerates full liquids in AM

## 2020-02-28 NOTE — DISCHARGE INSTRUCTIONS
Best of luck to you !- Nupur MARIA RN BSN    Please take antibiotics as instructed.    Take a liquid diet for 1-2 weeks at home, followed by a low fiber diet for 4 weeks.

## 2020-02-28 NOTE — PLAN OF CARE
Pt. A&o, vss, up independently, denied any pain, voiding adequate amount in b.r, passing gas tolerated Full liquid diet, pt. Is aware he will advance his diet as tolerated, discharge instructions reviewed with patient and discharge medications given, and pt. Wanted to take oxycodone script not to be filled in here and pt. discharge to home with family at 1115.

## 2020-02-28 NOTE — PLAN OF CARE
VSS on RA, denies pain, IV Zosyn, tolerating clear liquids, up independently in room, voiding in BR.

## 2020-02-28 NOTE — DISCHARGE SUMMARY
[facility]    Discharge Summary  Hospitalist    Date of Admission:  2/26/2020  Date of Discharge:  2/28/2020  Discharging Provider: Mychal Styles MD  Date of Service (when I saw the patient): 02/28/20    Discharge Diagnoses   Acute diverticulitis      Hospital Course     Fredi Reilly is a 64 year old male with PMHx of GERD and hiatal hernia who presented to [facility] ED for further evaluation of persistent abdominal pain, found to have severe, acute diverticulitis. Transferred to [facility] on 2/26 as no beds were available at [facility].      Acute diverticulitis, severe, with local perforation  Has hx of described, recurrent, untreated diverticulitis over the past year. Presenting with progressively worsening LLQ abdominal pain. CT scan showing severe acute diverticulitis involving the distal descending colon and proximal sigmoid colon w/ ruptured diverticulitis, no abscess, +small free fluid. Lactic acid 0.8, lipase 115. WBC 16.3  - CRS recs:   - full liquid diet for 1-2 weeks, then low fiber diet for 4 wks   - no need for f/u w/ CRS since pt has up to date colonoscopy & this is his first episode of diverticulitis   - 2 wks of abx  - pt received empiric Zosyn during hospital stay, which was switched to PO ciprofloxacin and metronidazole on discharge.    Hyponatremia: Sodium 132 on admission in the setting of poor PO intake. Improved to 136 w/ IVF    Normocytic anemia: No prior Hgb for comparison. No active signs of bleeding, pt denies melena, hematochezia, hematemesis, hemoptysis. Hgb 11s - 12s during hospital stay, stable.      Mychal Styles MD    Significant Results and Procedures   See below    Pending Results   These results will be followed up by PCP  Unresulted Labs Ordered in the Past 30 Days of this Admission     Date and Time Order Name Status Description    2/26/2020 1227 Blood culture Preliminary     2/26/2020 1225 Blood culture Preliminary           Code Status   Full Code       Primary  Care Physician   Dereje Camacho    General: well-appearing, NAD  HEENT: NC/AT, moist mucus membranes  Heart: RRR no m/r/g  Lungs: CTAB no crackles or wheezes  Abdomen: NABS, mildly tender to deep palpation in LLQ, non-distended; no rebound or guarding; no HSM or masses.  Extremities: warm, well-perfused. No ankle edema.  Neuro: CN II-XII grossly intact, moving extremities spontaneously  Psych: normal mood and affect    Discharge Disposition   Discharged to home  Condition at discharge: Stable    Consultations This Hospital Stay   COLORECTAL SURGERY IP CONSULT    Time Spent on this Encounter   I, Mychal Styles MD, personally saw the patient today and spent less than or equal to 30 minutes discharging this patient.    Discharge Orders      Reason for your hospital stay    diverticulitis     Follow-up and recommended labs and tests     Follow up with primary care provider, Dereje Camacho, within 7 days for hospital follow- up.  No follow up labs or test are needed.     Activity    Your activity upon discharge: activity as tolerated     Diet    Follow this diet upon discharge: Orders Placed This Encounter      Full Liquid Diet     Discharge Medications   Current Discharge Medication List      START taking these medications    Details   ciprofloxacin (CIPRO) 750 MG tablet Take 1 tablet (750 mg) by mouth 2 times daily  Qty: 26 tablet, Refills: 0    Comments: Future refills by PCP Dr. Dereje Camacho with phone number [phone].  Associated Diagnoses: Acute diverticulitis      metroNIDAZOLE (FLAGYL) 500 MG tablet Take 1 tablet (500 mg) by mouth 4 times daily for 13 days  Qty: 52 tablet, Refills: 0    Comments: Future refills by PCP Dr. Dereje Camacho with phone number [phone].  Associated Diagnoses: Acute diverticulitis      oxyCODONE (ROXICODONE) 5 MG tablet Take 1-2 tablets (5-10 mg) by mouth every 3 hours as needed  Qty: 15 tablet, Refills: 0    Comments: Future refills by PCP Dr. Dereje COKER  Doug with phone number [phone].  Associated Diagnoses: Acute diverticulitis           Allergies   No Known Allergies  Data   Most Recent 3 CBC's:  Recent Labs   Lab Test 02/27/20  0638 02/26/20  1049   WBC 11.4* 16.3*   HGB 11.0* 12.2*   MCV 88 91    205      Most Recent 3 BMP's:  Recent Labs   Lab Test 02/27/20  0638 02/26/20  1047    132*   POTASSIUM 4.2 3.9   CHLORIDE 106 102   CO2 25 23   BUN 13 12   CR 1.22 1.14   ANIONGAP 5 7   MUSHTAQ 8.5 8.8   GLC 91 134*     Most Recent 2 LFT's:  Recent Labs   Lab Test 02/26/20  1653   AST 17   ALT 29   ALKPHOS 75   BILITOTAL 0.6     Most Recent INR's and Anticoagulation Dosing History:  Anticoagulation Dose History     There is no flowsheet data to display.        Most Recent 3 Troponin's:No lab results found.  Most Recent Cholesterol Panel:No lab results found.  Most Recent 6 Bacteria Isolates From Any Culture (See EPIC Reports for Culture Details):  Recent Labs   Lab Test 02/26/20  1234 02/26/20  1231   CULT No growth after 2 days No growth after 2 days     Most Recent TSH, T4 and A1c Labs:No lab results found.  Results for orders placed or performed during the hospital encounter of 02/26/20   CT Abdomen Pelvis w Contrast    Narrative    CT ABDOMEN AND PELVIS WITH CONTRAST 2/26/2020 12:12 PM    CLINICAL HISTORY: Abdominal pain, diverticulitis suspected.    TECHNIQUE: CT scan of the abdomen and pelvis was performed following  injection of IV contrast. Multiplanar reformats were obtained. Dose  reduction techniques were used.  CONTRAST: 100 mL Isovue-370    COMPARISON: None.    FINDINGS:   LOWER CHEST: Normal.    HEPATOBILIARY: Fatty liver. No acute abnormality. Normal appearance of  the gallbladder.    PANCREAS: Normal.    SPLEEN: Normal.    ADRENAL GLANDS: Normal.    KIDNEYS/BLADDER: Mild distention of the bilateral ureters but no  obstructing etiology can be seen. Otherwise unremarkable kidneys.    BOWEL: Severe acute diverticulitis at the distal  descending colon and  proximal sigmoid colon series 4 image 168 and adjacent images. Tiny  bubbles of gas at the involved diverticulum noted on series 4 image  165 that may be just extraluminal. There is no large free air. No  bowel obstruction. Prominent edema and trace fluid at the left lower  quadrant, but no loculated abscess.    PELVIC ORGANS: Normal.    ADDITIONAL FINDINGS: Vascular calcifications.    MUSCULOSKELETAL: Normal.      Impression    IMPRESSION:   1.  Severe acute diverticulitis involving the distal descending colon  and proximal sigmoid colon. A few tiny bubbles of immediately adjacent  extraluminal gas noted indicating ruptured diverticulitis. No abscess  is seen. Small free fluid is noted in this region.  2.  Fatty liver.  3.  Mild distention of the bilateral ureters but no obstructing  etiology is seen.    CADE BOWSER MD

## 2020-03-03 LAB
BACTERIA SPEC CULT: NO GROWTH
BACTERIA SPEC CULT: NO GROWTH
SPECIMEN SOURCE: NORMAL
SPECIMEN SOURCE: NORMAL

## 2021-05-28 ENCOUNTER — APPOINTMENT (OUTPATIENT)
Dept: CT IMAGING | Facility: CLINIC | Age: 66
End: 2021-05-28
Attending: EMERGENCY MEDICINE
Payer: MEDICARE

## 2021-05-28 ENCOUNTER — HOSPITAL ENCOUNTER (EMERGENCY)
Facility: CLINIC | Age: 66
Discharge: HOME | End: 2021-05-28
Attending: EMERGENCY MEDICINE | Admitting: EMERGENCY MEDICINE
Payer: MEDICARE

## 2021-05-28 VITALS
WEIGHT: 234.57 LBS | BODY MASS INDEX: 33.58 KG/M2 | TEMPERATURE: 98.3 F | OXYGEN SATURATION: 98 % | RESPIRATION RATE: 16 BRPM | SYSTOLIC BLOOD PRESSURE: 116 MMHG | HEIGHT: 70 IN | HEART RATE: 78 BPM | DIASTOLIC BLOOD PRESSURE: 87 MMHG

## 2021-05-28 DIAGNOSIS — K57.32 DIVERTICULITIS OF COLON: ICD-10-CM

## 2021-05-28 LAB
ALBUMIN SERPL-MCNC: 3.6 G/DL (ref 3.4–5)
ALBUMIN UR-MCNC: NEGATIVE MG/DL
ALP SERPL-CCNC: 78 U/L (ref 40–150)
ALT SERPL W P-5'-P-CCNC: 23 U/L (ref 0–70)
ANION GAP SERPL CALCULATED.3IONS-SCNC: 6 MMOL/L (ref 3–14)
APPEARANCE UR: CLEAR
AST SERPL W P-5'-P-CCNC: 8 U/L (ref 0–45)
BASOPHILS # BLD AUTO: 0.1 10E9/L (ref 0–0.2)
BASOPHILS NFR BLD AUTO: 0.5 %
BILIRUB SERPL-MCNC: 0.7 MG/DL (ref 0.2–1.3)
BILIRUB UR QL STRIP: NEGATIVE
BUN SERPL-MCNC: 10 MG/DL (ref 7–30)
CALCIUM SERPL-MCNC: 9.2 MG/DL (ref 8.5–10.1)
CHLORIDE SERPL-SCNC: 104 MMOL/L (ref 94–109)
CO2 SERPL-SCNC: 27 MMOL/L (ref 20–32)
COLOR UR AUTO: NORMAL
CREAT SERPL-MCNC: 1.13 MG/DL (ref 0.66–1.25)
DIFFERENTIAL METHOD BLD: NORMAL
EOSINOPHIL # BLD AUTO: 0.4 10E9/L (ref 0–0.7)
EOSINOPHIL NFR BLD AUTO: 3.8 %
ERYTHROCYTE [DISTWIDTH] IN BLOOD BY AUTOMATED COUNT: 12.5 % (ref 10–15)
GFR SERPL CREATININE-BSD FRML MDRD: 67 ML/MIN/{1.73_M2}
GLUCOSE SERPL-MCNC: 97 MG/DL (ref 70–99)
GLUCOSE UR STRIP-MCNC: NEGATIVE MG/DL
HCT VFR BLD AUTO: 42.4 % (ref 40–53)
HGB BLD-MCNC: 13.6 G/DL (ref 13.3–17.7)
HGB UR QL STRIP: NEGATIVE
IMM GRANULOCYTES # BLD: 0.1 10E9/L (ref 0–0.4)
IMM GRANULOCYTES NFR BLD: 0.5 %
KETONES UR STRIP-MCNC: NEGATIVE MG/DL
LACTATE BLD-SCNC: 1.2 MMOL/L (ref 0.7–2)
LEUKOCYTE ESTERASE UR QL STRIP: NEGATIVE
LIPASE SERPL-CCNC: 122 U/L (ref 73–393)
LYMPHOCYTES # BLD AUTO: 1.4 10E9/L (ref 0.8–5.3)
LYMPHOCYTES NFR BLD AUTO: 15.1 %
MCH RBC QN AUTO: 29.5 PG (ref 26.5–33)
MCHC RBC AUTO-ENTMCNC: 32.1 G/DL (ref 31.5–36.5)
MCV RBC AUTO: 92 FL (ref 78–100)
MONOCYTES # BLD AUTO: 0.7 10E9/L (ref 0–1.3)
MONOCYTES NFR BLD AUTO: 7.7 %
NEUTROPHILS # BLD AUTO: 6.7 10E9/L (ref 1.6–8.3)
NEUTROPHILS NFR BLD AUTO: 72.4 %
NITRATE UR QL: NEGATIVE
NRBC # BLD AUTO: 0 10*3/UL
NRBC BLD AUTO-RTO: 0 /100
PH UR STRIP: 6.5 PH (ref 5–7)
PLATELET # BLD AUTO: 224 10E9/L (ref 150–450)
POTASSIUM SERPL-SCNC: 4.3 MMOL/L (ref 3.4–5.3)
PROT SERPL-MCNC: 8.1 G/DL (ref 6.8–8.8)
RBC # BLD AUTO: 4.61 10E12/L (ref 4.4–5.9)
RBC #/AREA URNS AUTO: <1 /HPF (ref 0–2)
SODIUM SERPL-SCNC: 135 MMOL/L (ref 133–144)
SOURCE: NORMAL
SP GR UR STRIP: 1.01 (ref 1–1.03)
UROBILINOGEN UR STRIP-MCNC: NORMAL MG/DL (ref 0–2)
WBC # BLD AUTO: 9.3 10E9/L (ref 4–11)
WBC #/AREA URNS AUTO: <1 /HPF (ref 0–5)

## 2021-05-28 PROCEDURE — 85025 COMPLETE CBC W/AUTO DIFF WBC: CPT | Performed by: EMERGENCY MEDICINE

## 2021-05-28 PROCEDURE — 99285 EMERGENCY DEPT VISIT HI MDM: CPT | Mod: 25

## 2021-05-28 PROCEDURE — 250N000009 HC RX 250: Performed by: EMERGENCY MEDICINE

## 2021-05-28 PROCEDURE — 83690 ASSAY OF LIPASE: CPT | Performed by: EMERGENCY MEDICINE

## 2021-05-28 PROCEDURE — 250N000011 HC RX IP 250 OP 636: Performed by: EMERGENCY MEDICINE

## 2021-05-28 PROCEDURE — 83605 ASSAY OF LACTIC ACID: CPT | Performed by: EMERGENCY MEDICINE

## 2021-05-28 PROCEDURE — 80053 COMPREHEN METABOLIC PANEL: CPT | Performed by: EMERGENCY MEDICINE

## 2021-05-28 PROCEDURE — 74177 CT ABD & PELVIS W/CONTRAST: CPT

## 2021-05-28 PROCEDURE — 81001 URINALYSIS AUTO W/SCOPE: CPT | Performed by: EMERGENCY MEDICINE

## 2021-05-28 RX ORDER — METRONIDAZOLE 500 MG/1
500 TABLET ORAL 3 TIMES DAILY
Qty: 42 TABLET | Refills: 0 | Status: SHIPPED | OUTPATIENT
Start: 2021-05-28 | End: 2021-06-11

## 2021-05-28 RX ORDER — CIPROFLOXACIN 500 MG/1
500 TABLET, FILM COATED ORAL 2 TIMES DAILY
Qty: 28 TABLET | Refills: 0 | Status: SHIPPED | OUTPATIENT
Start: 2021-05-28 | End: 2021-06-11

## 2021-05-28 RX ORDER — IOPAMIDOL 755 MG/ML
500 INJECTION, SOLUTION INTRAVASCULAR ONCE
Status: COMPLETED | OUTPATIENT
Start: 2021-05-28 | End: 2021-05-28

## 2021-05-28 RX ADMIN — SODIUM CHLORIDE 65 ML: 9 INJECTION, SOLUTION INTRAVENOUS at 11:25

## 2021-05-28 RX ADMIN — IOPAMIDOL 100 ML: 755 INJECTION, SOLUTION INTRAVENOUS at 11:27

## 2021-05-28 ASSESSMENT — MIFFLIN-ST. JEOR: SCORE: 1850.25

## 2021-05-28 ASSESSMENT — ENCOUNTER SYMPTOMS
FEVER: 0
CHILLS: 0
COUGH: 0
VOMITING: 0
CONSTIPATION: 1
DIFFICULTY URINATING: 1
ABDOMINAL PAIN: 1
APPETITE CHANGE: 1

## 2021-05-28 NOTE — ED NOTES
Patient sitting at bedside watching TV.  He reports his pain is a bit better now and has declined the offer of pain medication at this time.

## 2021-05-28 NOTE — ED PROVIDER NOTES
"  History   Chief Complaint:  Abdominal Pain       The history is provided by the patient.      Fredi Reilly is a 65 year old male with history of diverticulities who presents with bilateal lower abdominal pain, constipation and difficulty urinating. The patient was admitted to the hospital around one year ago for diverticulitis and notes that this improved with antibiotics at that time. He has been able to control this with switching to a liquid diet when symptoms begin to arise. Because of this, he has had a decreased intake but notes no improvement in his symptoms. He denies any fever, cough, vomiting, chills, testicular pain, penile pain, scrotal mass or abdominal trauma. He presents today concerned that his diverticulitis is \"leaking\", noting that his pain is similar to when he was hospitalized one year ago but less in intensity. He has a past history of umbilical hernia repair but no other abdominal surgeries.     Review of Systems   Constitutional: Positive for appetite change (decreased intake). Negative for chills and fever.   Respiratory: Negative for cough.    Gastrointestinal: Positive for abdominal pain and constipation. Negative for vomiting.   Genitourinary: Positive for difficulty urinating. Negative for penile pain and testicular pain.   All other systems reviewed and are negative.      Allergies:  No Known Drug Allergies     Medications:  The patient is not currently taking any prescribed medications.    Past Medical History:    GERD  Hiatal hernia  Diverticulitis    Past Surgical History:    Herniorrhaphy umbilical  Knee surgery     Family History:    Hyperlipidemia  CAD    Social History:  The patient presents to the emergency department alone.    Physical Exam     Patient Vitals for the past 24 hrs:   BP Temp Temp src Pulse Resp SpO2 Height Weight   05/28/21 1200 116/87 98.3  F (36.8  C) -- 78 16 98 % -- --   05/28/21 1120 122/85 98.3  F (36.8  C) Oral 78 16 98 % -- --   05/28/21 0942 (!) " "153/93 97.6  F (36.4  C) Temporal 80 16 98 % 1.778 m (5' 10\") 106.4 kg (234 lb 9.1 oz)       Physical Exam  Constitutional: Well appearing.  HEENT: Atraumatic. Moist mucous membranes.  Neck: Soft.  Supple.    Cardiac: Regular rate and rhythm.  No murmur or rub.  Respiratory: Clear to auscultation bilaterally.  No respiratory distress.   Abdomen: Soft with minimal tenderness in the left and right lower quadrants.  No rebound or guarding.  Nondistended.  Musculoskeletal: No edema.  Normal range of motion.  Neurologic: Alert and oriented.  Normal tone and bulk. Normal gait.  Skin: No rashes.  No edema.  Psych: Normal affect.  Normal behavior.    Emergency Department Course     Imaging:  CT Abdomen/Pelvis with IV contrast:   Findings compatible with acute diverticulitis. No evidence of perforation or abscess.  As per radiology.    Laboratory:  CBC: WBC: 9.3, HB.6, PLT: 224    CMP: WNL (Creatinine: 1.13)    UA with Microscopic: Negative    Lactic Acid Whole Blood (0951): 1.2    Lipase: 122    Emergency Department Course:    Reviewed:  I reviewed the patient's nursing notes, vitals, past medical records, Care Everywhere.     Assessments:  0945: I assessed the patient and performed a physical exam at this time.  1230: I reassessed the patient and informed them of their workup thus far. At this point I feel that the patient is safe for discharge, and the patient agrees.     Disposition:  The patient was discharged to home.     Impression & Plan     Medical Decision Making:  .Name is a 65-year-old man who is afebrile and hemodynamically stable.  His abdomen is soft and without acute peritoneal signs.  Lab work-up is noted as above and is grossly unrevealing.  CT scan of the abdomen and pelvis demonstrated acute diverticulitis without complication such as perforation or abscess.  He declined the need for any pain medication here.  He is very happy to hear these results.  He is in agreement with plan for home with oral " antibiotics.  Declines the need for pain medication at home.  I do think outpatient management is reasonable as he has no systemic signs of infection, vomiting, uncontrolled pain, or other concerning symptoms.  He feels comfortable this plan will follow up closely with primary care physician.  We discussed supportive care at home and return precautions were given.  He was in no distress at time of discharge.      Diagnosis:    ICD-10-CM    1. Diverticulitis of colon  K57.32        Discharge Medications:  Discharge Medication List as of 5/28/2021 12:42 PM      START taking these medications    Details   !! ciprofloxacin (CIPRO) 500 MG tablet Take 1 tablet (500 mg) by mouth 2 times daily for 14 days, Disp-28 tablet, R-0, E-Prescribe      metroNIDAZOLE (FLAGYL) 500 MG tablet Take 1 tablet (500 mg) by mouth 3 times daily for 14 days, Disp-42 tablet, R-0, E-PrescribeEat yogurt or cottage cheese daily to prevent diarrhea that can be caused by taking this antibiotic.       !! - Potential duplicate medications found. Please discuss with provider.          Scribe Disclosure:  I, King Melendez, am serving as a scribe at 9:49 AM on 5/28/2021 to document services personally performed by Bal Austin MD based on my observations and the provider's statements to me.          Bal Austin MD  05/28/21 8436

## 2021-05-28 NOTE — ED TRIAGE NOTES
Patient with hx of diverticulitis complains of bilateral lower quadrant pain, constipation, and urinary retention. Denies nausea/vomiting. ABCs intact. GCS 15.

## 2023-06-30 ENCOUNTER — HOSPITAL ENCOUNTER (OUTPATIENT)
Facility: CLINIC | Age: 68
Setting detail: OBSERVATION
Discharge: HOME | End: 2023-07-01
Attending: EMERGENCY MEDICINE | Admitting: HOSPITALIST
Payer: MEDICARE

## 2023-06-30 ENCOUNTER — APPOINTMENT (OUTPATIENT)
Dept: CT IMAGING | Facility: CLINIC | Age: 68
End: 2023-06-30
Attending: EMERGENCY MEDICINE
Payer: MEDICARE

## 2023-06-30 DIAGNOSIS — J03.90 TONSILLITIS, PHLEGMONOUS: ICD-10-CM

## 2023-06-30 LAB
ANION GAP SERPL CALCULATED.3IONS-SCNC: 11 MMOL/L (ref 7–15)
BASOPHILS # BLD AUTO: 0 10E3/UL (ref 0–0.2)
BASOPHILS NFR BLD AUTO: 0 %
BUN SERPL-MCNC: 9.2 MG/DL (ref 8–23)
CALCIUM SERPL-MCNC: 9.3 MG/DL (ref 8.8–10.2)
CHLORIDE SERPL-SCNC: 99 MMOL/L (ref 98–107)
CREAT SERPL-MCNC: 1.22 MG/DL (ref 0.67–1.17)
DEPRECATED HCO3 PLAS-SCNC: 26 MMOL/L (ref 22–29)
EOSINOPHIL # BLD AUTO: 0.1 10E3/UL (ref 0–0.7)
EOSINOPHIL NFR BLD AUTO: 1 %
ERYTHROCYTE [DISTWIDTH] IN BLOOD BY AUTOMATED COUNT: 12.8 % (ref 10–15)
GFR SERPL CREATININE-BSD FRML MDRD: 65 ML/MIN/1.73M2
GLUCOSE SERPL-MCNC: 105 MG/DL (ref 70–99)
GROUP A STREP BY PCR: NOT DETECTED
HCO3 BLDV-SCNC: 28 MMOL/L (ref 21–28)
HCT VFR BLD AUTO: 44.5 % (ref 40–53)
HGB BLD-MCNC: 14.4 G/DL (ref 13.3–17.7)
HOLD SPECIMEN: NORMAL
HOLD SPECIMEN: NORMAL
IMM GRANULOCYTES # BLD: 0 10E3/UL
IMM GRANULOCYTES NFR BLD: 0 %
LACTATE BLD-SCNC: 0.8 MMOL/L
LYMPHOCYTES # BLD AUTO: 1.2 10E3/UL (ref 0.8–5.3)
LYMPHOCYTES NFR BLD AUTO: 10 %
MCH RBC QN AUTO: 29.9 PG (ref 26.5–33)
MCHC RBC AUTO-ENTMCNC: 32.4 G/DL (ref 31.5–36.5)
MCV RBC AUTO: 93 FL (ref 78–100)
MONOCYTES # BLD AUTO: 0.9 10E3/UL (ref 0–1.3)
MONOCYTES NFR BLD AUTO: 8 %
MRSA DNA SPEC QL NAA+PROBE: NEGATIVE
NEUTROPHILS # BLD AUTO: 9.3 10E3/UL (ref 1.6–8.3)
NEUTROPHILS NFR BLD AUTO: 81 %
NRBC # BLD AUTO: 0 10E3/UL
NRBC BLD AUTO-RTO: 0 /100
PCO2 BLDV: 47 MM HG (ref 40–50)
PH BLDV: 7.38 [PH] (ref 7.32–7.43)
PLATELET # BLD AUTO: 241 10E3/UL (ref 150–450)
PO2 BLDV: 16 MM HG (ref 25–47)
POTASSIUM SERPL-SCNC: 4.2 MMOL/L (ref 3.4–5.3)
RBC # BLD AUTO: 4.81 10E6/UL (ref 4.4–5.9)
SA TARGET DNA: POSITIVE
SAO2 % BLDV: 22 % (ref 94–100)
SODIUM SERPL-SCNC: 136 MMOL/L (ref 136–145)
WBC # BLD AUTO: 11.6 10E3/UL (ref 4–11)

## 2023-06-30 PROCEDURE — 83605 ASSAY OF LACTIC ACID: CPT

## 2023-06-30 PROCEDURE — 250N000011 HC RX IP 250 OP 636: Mod: JZ | Performed by: EMERGENCY MEDICINE

## 2023-06-30 PROCEDURE — 99285 EMERGENCY DEPT VISIT HI MDM: CPT | Mod: 25

## 2023-06-30 PROCEDURE — G1010 CDSM STANSON: HCPCS

## 2023-06-30 PROCEDURE — 250N000009 HC RX 250: Performed by: EMERGENCY MEDICINE

## 2023-06-30 PROCEDURE — 82803 BLOOD GASES ANY COMBINATION: CPT

## 2023-06-30 PROCEDURE — 87641 MR-STAPH DNA AMP PROBE: CPT

## 2023-06-30 PROCEDURE — 87651 STREP A DNA AMP PROBE: CPT | Performed by: EMERGENCY MEDICINE

## 2023-06-30 PROCEDURE — 96374 THER/PROPH/DIAG INJ IV PUSH: CPT | Mod: XU

## 2023-06-30 PROCEDURE — G0378 HOSPITAL OBSERVATION PER HR: HCPCS

## 2023-06-30 PROCEDURE — 80048 BASIC METABOLIC PNL TOTAL CA: CPT | Performed by: EMERGENCY MEDICINE

## 2023-06-30 PROCEDURE — 85018 HEMOGLOBIN: CPT | Performed by: EMERGENCY MEDICINE

## 2023-06-30 PROCEDURE — 250N000011 HC RX IP 250 OP 636: Performed by: EMERGENCY MEDICINE

## 2023-06-30 PROCEDURE — 36415 COLL VENOUS BLD VENIPUNCTURE: CPT | Performed by: EMERGENCY MEDICINE

## 2023-06-30 PROCEDURE — 250N000011 HC RX IP 250 OP 636: Mod: JZ

## 2023-06-30 PROCEDURE — 99222 1ST HOSP IP/OBS MODERATE 55: CPT | Mod: AI

## 2023-06-30 PROCEDURE — 96375 TX/PRO/DX INJ NEW DRUG ADDON: CPT | Mod: XU

## 2023-06-30 PROCEDURE — 96376 TX/PRO/DX INJ SAME DRUG ADON: CPT

## 2023-06-30 PROCEDURE — 250N000013 HC RX MED GY IP 250 OP 250 PS 637

## 2023-06-30 PROCEDURE — 96365 THER/PROPH/DIAG IV INF INIT: CPT | Mod: XU

## 2023-06-30 RX ORDER — ACETAMINOPHEN 325 MG/1
975 TABLET ORAL EVERY 8 HOURS
Status: DISCONTINUED | OUTPATIENT
Start: 2023-06-30 | End: 2023-07-01 | Stop reason: HOSPADM

## 2023-06-30 RX ORDER — ASPIRIN 325 MG
650 TABLET, DELAYED RELEASE (ENTERIC COATED) ORAL ONCE
Status: ON HOLD | COMMUNITY
End: 2023-07-01

## 2023-06-30 RX ORDER — IBUPROFEN 200 MG
200 TABLET ORAL ONCE
Status: ON HOLD | COMMUNITY
End: 2023-07-01

## 2023-06-30 RX ORDER — AMPICILLIN AND SULBACTAM 2; 1 G/1; G/1
3 INJECTION, POWDER, FOR SOLUTION INTRAMUSCULAR; INTRAVENOUS ONCE
Status: COMPLETED | OUTPATIENT
Start: 2023-06-30 | End: 2023-06-30

## 2023-06-30 RX ORDER — ONDANSETRON 2 MG/ML
4 INJECTION INTRAMUSCULAR; INTRAVENOUS EVERY 6 HOURS PRN
Status: DISCONTINUED | OUTPATIENT
Start: 2023-06-30 | End: 2023-07-01 | Stop reason: HOSPADM

## 2023-06-30 RX ORDER — IOPAMIDOL 755 MG/ML
500 INJECTION, SOLUTION INTRAVASCULAR ONCE
Status: COMPLETED | OUTPATIENT
Start: 2023-06-30 | End: 2023-06-30

## 2023-06-30 RX ORDER — AMPICILLIN AND SULBACTAM 2; 1 G/1; G/1
3 INJECTION, POWDER, FOR SOLUTION INTRAMUSCULAR; INTRAVENOUS EVERY 6 HOURS
Status: DISCONTINUED | OUTPATIENT
Start: 2023-06-30 | End: 2023-07-01 | Stop reason: HOSPADM

## 2023-06-30 RX ORDER — AMOXICILLIN 250 MG
2 CAPSULE ORAL 2 TIMES DAILY PRN
Status: DISCONTINUED | OUTPATIENT
Start: 2023-06-30 | End: 2023-07-01 | Stop reason: HOSPADM

## 2023-06-30 RX ORDER — ONDANSETRON 4 MG/1
4 TABLET, ORALLY DISINTEGRATING ORAL EVERY 6 HOURS PRN
Status: DISCONTINUED | OUTPATIENT
Start: 2023-06-30 | End: 2023-07-01 | Stop reason: HOSPADM

## 2023-06-30 RX ORDER — DEXAMETHASONE SODIUM PHOSPHATE 10 MG/ML
10 INJECTION, SOLUTION INTRAMUSCULAR; INTRAVENOUS EVERY 8 HOURS
Status: DISCONTINUED | OUTPATIENT
Start: 2023-06-30 | End: 2023-07-01 | Stop reason: HOSPADM

## 2023-06-30 RX ORDER — AMOXICILLIN 250 MG
1 CAPSULE ORAL 2 TIMES DAILY PRN
Status: DISCONTINUED | OUTPATIENT
Start: 2023-06-30 | End: 2023-07-01 | Stop reason: HOSPADM

## 2023-06-30 RX ORDER — KETOROLAC TROMETHAMINE 15 MG/ML
15 INJECTION, SOLUTION INTRAMUSCULAR; INTRAVENOUS ONCE
Status: COMPLETED | OUTPATIENT
Start: 2023-06-30 | End: 2023-06-30

## 2023-06-30 RX ORDER — LIDOCAINE 40 MG/G
CREAM TOPICAL
Status: DISCONTINUED | OUTPATIENT
Start: 2023-06-30 | End: 2023-07-01 | Stop reason: HOSPADM

## 2023-06-30 RX ORDER — DEXAMETHASONE SODIUM PHOSPHATE 10 MG/ML
10 INJECTION, SOLUTION INTRAMUSCULAR; INTRAVENOUS ONCE
Status: COMPLETED | OUTPATIENT
Start: 2023-06-30 | End: 2023-06-30

## 2023-06-30 RX ADMIN — KETOROLAC TROMETHAMINE 15 MG: 15 INJECTION, SOLUTION INTRAMUSCULAR; INTRAVENOUS at 13:19

## 2023-06-30 RX ADMIN — DEXAMETHASONE SODIUM PHOSPHATE 10 MG: 10 INJECTION, SOLUTION INTRAMUSCULAR; INTRAVENOUS at 20:59

## 2023-06-30 RX ADMIN — AMPICILLIN SODIUM AND SULBACTAM SODIUM 3 G: 2; 1 INJECTION, POWDER, FOR SOLUTION INTRAMUSCULAR; INTRAVENOUS at 16:01

## 2023-06-30 RX ADMIN — DEXAMETHASONE SODIUM PHOSPHATE 10 MG: 10 INJECTION INTRAMUSCULAR; INTRAVENOUS at 13:20

## 2023-06-30 RX ADMIN — IOPAMIDOL 100 ML: 755 INJECTION, SOLUTION INTRAVENOUS at 14:01

## 2023-06-30 RX ADMIN — AMPICILLIN SODIUM AND SULBACTAM SODIUM 3 G: 2; 1 INJECTION, POWDER, FOR SOLUTION INTRAMUSCULAR; INTRAVENOUS at 21:36

## 2023-06-30 RX ADMIN — ACETAMINOPHEN 975 MG: 325 TABLET, FILM COATED ORAL at 17:26

## 2023-06-30 RX ADMIN — SODIUM CHLORIDE 65 ML: 9 INJECTION, SOLUTION INTRAVENOUS at 14:01

## 2023-06-30 ASSESSMENT — ACTIVITIES OF DAILY LIVING (ADL)
ADLS_ACUITY_SCORE: 35

## 2023-06-30 NOTE — ED PROVIDER NOTES
History     Chief Complaint:  Sore Throat     HPI   Fredi Reilly is a 67 year old male who presents for evaluation of a sore throat. About three days ago the patient started to develop a sore throat that is worse with swallowing and when talking as well as some left-sided facial pain, left ear pain, and a headache. Yesterday he started to develop a fever to 102 at home. He has been taking Tylenol with some improvement of his symptoms. Today he was seen at a [facility] where he had a negative molecular strep test and was advised to come into the ED for further evaluation and management. He denies any chills, dental pain, rhinorrhea, or body aches.     Independent Historian:   Spouse/Partner - They report the history as above.     Review of External Notes:   None       Medications:    The patient is not currently taking any prescribed medications.     Past Medical History:    GERD   Hiatal hernia     Past Surgical History:    Umbilical herniorrhaphy   Knee surgery      Physical Exam     Patient Vitals for the past 24 hrs:   BP Temp Pulse Resp SpO2   06/30/23 1556 (!) 132/91 -- -- -- --   06/30/23 1541 (!) 146/97 -- -- -- --   06/30/23 1526 (!) 140/96 -- -- -- --   06/30/23 1511 (!) 139/96 -- -- -- --   06/30/23 1456 (!) 153/84 -- -- -- 92 %   06/30/23 1441 (!) 152/93 -- -- -- 94 %   06/30/23 1432 (!) 142/83 -- 84 -- 96 %   06/30/23 1426 (!) 142/83 -- -- -- 95 %   06/30/23 1417 (!) 142/81 -- 84 -- 95 %   06/30/23 1347 (!) 144/97 -- 84 -- 95 %   06/30/23 1332 (!) 149/95 -- 83 -- 96 %   06/30/23 1317 (!) 152/92 -- -- -- --   06/30/23 1209 (!) 147/94 98.5  F (36.9  C) 81 18 98 %        Physical Exam  General: Alert, no acute distress; hoarse sounding voice  HEENT:   Moist mucous membranes, conjunctiva normal.  Posterior oropharynx erythematous, no tonsillar exudates.  Uvula appears edematous but otherwise midline.  No stridor. No palatal petechiae.  Floor of mouth is soft.  No trismus.  No cervical LAD, no  tracheal tenderness. Conjunctiva normal. TMs clear bilaterally  CV:  RRR, no m/r/g, skin warm and well perfused  Pulm:  CTAB, no wheezes/ronchi/rales.  No acute distress, breathing comfortably; no stridor.  MSK:  Moving all extremities.  No focal areas of edema, erythema  Skin:  WWP, no rashes, skin color normal, no diaphoresis    Emergency Department Course     Imaging:  Soft tissue neck CT w contrast   Final Result   IMPRESSION:   1. Findings consistent with acute bilateral palatine tonsillitis   including a 0.9 cm area of phlegmonous change or early abscess   formation in the left palatine tonsil.   2. Mild presumed reactive lymphadenopathy in the suprahyoid neck on   both sides.   3. Otherwise, normal soft tissue neck CT.         Radiation dose for this scan was reduced using automated exposure   control, adjustment of the mA and/or kV according to patient size, or   iterative reconstruction technique      SUSAN YANEZ MD            SYSTEM ID:  [id number]      Report per radiology    Laboratory:  Labs Ordered and Resulted from Time of ED Arrival to Time of ED Departure   BASIC METABOLIC PANEL - Abnormal       Result Value    Sodium 136      Potassium 4.2      Chloride 99      Carbon Dioxide (CO2) 26      Anion Gap 11      Urea Nitrogen 9.2      Creatinine 1.22 (*)     Calcium 9.3      Glucose 105 (*)     GFR Estimate 65     CBC WITH PLATELETS AND DIFFERENTIAL - Abnormal    WBC Count 11.6 (*)     RBC Count 4.81      Hemoglobin 14.4      Hematocrit 44.5      MCV 93      MCH 29.9      MCHC 32.4      RDW 12.8      Platelet Count 241      % Neutrophils 81      % Lymphocytes 10      % Monocytes 8      % Eosinophils 1      % Basophils 0      % Immature Granulocytes 0      NRBCs per 100 WBC 0      Absolute Neutrophils 9.3 (*)     Absolute Lymphocytes 1.2      Absolute Monocytes 0.9      Absolute Eosinophils 0.1      Absolute Basophils 0.0      Absolute Immature Granulocytes 0.0      Absolute NRBCs 0.0     ISTAT GASES  LACTATE VENOUS POCT - Abnormal    Lactic Acid POCT 0.8      Bicarbonate Venous POCT 28      O2 Sat, Venous POCT 22 (*)     pCO2 Venous POCT 47      pH Venous POCT 7.38      pO2 Venous POCT 16 (*)    GROUP A STREPTOCOCCUS PCR THROAT SWAB - Normal    Group A strep by PCR Not Detected     MRSA MSSA PCR, NASAL SWAB      Emergency Department Course & Assessments:     Interventions:  1319 Toradol 15 mg IV   1320 Decadron 10 mg IV   Unasyn 3 g IV     Assessments:  1231: The patient was seen and evaluated.     1440: I updated and reassessed the patient.     Independent Interpretation (X-rays, CTs, rhythm strip):  None    Consultations/Discussion of Management or Tests:  1458: I spoke with INDIO Alvarez for Dr. Rodriguez of the hospitalist service regarding patient's presentation, findings, and plan of care.         Social Determinants of Health affecting care:   None    Disposition:  The patient was admitted to the hospital under the care of Dr. Rodriguez.     Impression & Plan      Medical Decision Makin-year-old male presenting to the ER for evaluation of 3 days of sore throat.  [facility] and confirm negative here.  He is afebrile and hemodynamically stable.  He has no signs of respiratory distress and seems to be handling secretions at bedside.  However, he has significant pharyngitis noted and given rapidity of symptoms, we obtained CT of the neck's which did show bilateral [place] tonsillitis with 0.9 cm phlegmonous change versus early abscess.  No accessible abscess for bedside drainage at this time; I do not feel emergent ENT consultation is indicated at this time.  Lab studies notable for mild leukocytosis, normal lactic acid.  We will admit the patient for IV antibiotics and steroids and close monitoring and he is comfortable with this.  Discussed case with the hospitalist service who accepted the patient for hospital observation.     Diagnosis:    ICD-10-CM    1. Tonsillitis, phlegmonous  J03.90                 Scribe Disclosure:  I, Trae Ramon, am serving as a scribe at 12:31 PM on 6/30/2023 to document services personally performed by Shadi Mata MD based on my observations and the provider's statements to me.   6/30/2023   Shadi Mata MD [place], Edgar Ronald, MD  06/30/23 [id number]

## 2023-06-30 NOTE — ED NOTES
[facility]  ED Nurse Handoff Report    ED Chief complaint: Pharyngitis  . ED Diagnosis:   Final diagnoses:   Tonsillitis, phlegmonous       Allergies: No Known Allergies    Code Status: Full Code    Activity level - Baseline/Home:  independent.  Activity Level - Current:   independent.   Lift room needed: No.   Bariatric: No   Needed: No   Isolation: No.   Infection: Not Applicable.     Respiratory status: Room air    Vital Signs (within 30 minutes):   Vitals:    06/30/23 1511 06/30/23 1526 06/30/23 1541 06/30/23 1556   BP: (!) 139/96 (!) 140/96 (!) 146/97 (!) 132/91   Pulse:       Resp:       Temp:       SpO2:           Cardiac Rhythm:  ,      Pain level:    Patient confused: No.   Patient Falls Risk: nonskid shoes/slippers when out of bed and patient and family education.   Elimination Status: Has voided     Patient Report - Initial Complaint: Sore throat.   Focused Assessment: Fredi Reilly is a 67 year old male who presents for evaluation of a sore throat. About three days ago the patient started to develop a sore throat that is worse with swallowing and when talking as well as some left-sided facial pain, left ear pain, and a headache. Yesterday he started to develop a fever to 102 at home. He has been taking Tylenol with some improvement of his symptoms. Today he was seen at a [facility] where he had a negative molecular strep test and was advised to come into the ED for further evaluation and management. He denies any chills, dental pain, rhinorrhea, or body aches.      Abnormal Results:   Labs Ordered and Resulted from Time of ED Arrival to Time of ED Departure   BASIC METABOLIC PANEL - Abnormal       Result Value    Sodium 136      Potassium 4.2      Chloride 99      Carbon Dioxide (CO2) 26      Anion Gap 11      Urea Nitrogen 9.2      Creatinine 1.22 (*)     Calcium 9.3      Glucose 105 (*)     GFR Estimate 65     CBC WITH PLATELETS AND DIFFERENTIAL - Abnormal     WBC Count 11.6 (*)     RBC Count 4.81      Hemoglobin 14.4      Hematocrit 44.5      MCV 93      MCH 29.9      MCHC 32.4      RDW 12.8      Platelet Count 241      % Neutrophils 81      % Lymphocytes 10      % Monocytes 8      % Eosinophils 1      % Basophils 0      % Immature Granulocytes 0      NRBCs per 100 WBC 0      Absolute Neutrophils 9.3 (*)     Absolute Lymphocytes 1.2      Absolute Monocytes 0.9      Absolute Eosinophils 0.1      Absolute Basophils 0.0      Absolute Immature Granulocytes 0.0      Absolute NRBCs 0.0     ISTAT GASES LACTATE VENOUS POCT - Abnormal    Lactic Acid POCT 0.8      Bicarbonate Venous POCT 28      O2 Sat, Venous POCT 22 (*)     pCO2 Venous POCT 47      pH Venous POCT 7.38      pO2 Venous POCT 16 (*)    GROUP A STREPTOCOCCUS PCR THROAT SWAB - Normal    Group A strep by PCR Not Detected     MRSA MSSA PCR, NASAL SWAB        Soft tissue neck CT w contrast   Final Result   IMPRESSION:   1. Findings consistent with acute bilateral palatine tonsillitis   including a 0.9 cm area of phlegmonous change or early abscess   formation in the left palatine tonsil.   2. Mild presumed reactive lymphadenopathy in the suprahyoid neck on   both sides.   3. Otherwise, normal soft tissue neck CT.         Radiation dose for this scan was reduced using automated exposure   control, adjustment of the mA and/or kV according to patient size, or   iterative reconstruction technique      SUSAN YANEZ MD            SYSTEM ID:  [id number]          Treatments provided: IV ABX  Family Comments: Wife at bedside.   OBS brochure/video discussed/provided to patient:  Yes  ED Medications:   Medications   ampicillin-sulbactam (UNASYN) 3 g vial to attach to  mL bag (3 g Intravenous $New Bag 6/30/23 1641)   melatonin tablet 1 mg (has no administration in time range)   ondansetron (ZOFRAN ODT) ODT tab 4 mg (has no administration in time range)     Or   ondansetron (ZOFRAN) injection 4 mg (has no administration in  time range)   lidocaine 1 % 0.1-1 mL (has no administration in time range)   lidocaine (LMX4) cream (has no administration in time range)   sodium chloride (PF) 0.9% PF flush 3 mL (has no administration in time range)   sodium chloride (PF) 0.9% PF flush 3 mL (has no administration in time range)   acetaminophen (TYLENOL) tablet 975 mg (has no administration in time range)   senna-docusate (SENOKOT-S/PERICOLACE) 8.6-50 MG per tablet 1 tablet (has no administration in time range)     Or   senna-docusate (SENOKOT-S/PERICOLACE) 8.6-50 MG per tablet 2 tablet (has no administration in time range)   ampicillin-sulbactam (UNASYN) 3 g vial to attach to  mL bag (has no administration in time range)   dexamethasone PF (DECADRON) injection 10 mg (has no administration in time range)   dexamethasone PF (DECADRON) injection 10 mg (10 mg Intravenous $Given 6/30/23 1320)   ketorolac (TORADOL) injection 15 mg (15 mg Intravenous $Given 6/30/23 1319)   CT Scan Flush (65 mLs Intravenous $Given 6/30/23 1401)   iopamidol (ISOVUE-370) solution 500 mL (100 mLs Intravenous $Given 6/30/23 1401)       Drips infusing:  No  For the majority of the shift this patient was Green.   Interventions performed were N/A.    Sepsis treatment initiated: No    Cares/treatment/interventions/medications to be completed following ED care: See Orders.     ED Nurse Name: Gabi Garcia RN  4:12 PM    RECEIVING UNIT ED HANDOFF REVIEW    Above ED Nurse Handoff Report was reviewed: Yes  Reviewed by: Melody Gottlieb RN on June 30, 2023 at 6:10 PM

## 2023-06-30 NOTE — PHARMACY-ADMISSION MEDICATION HISTORY
Pharmacist Admission Medication History    Admission medication history is complete. The information provided in this note is only as accurate as the sources available at the time of the update.    Medication reconciliation/reorder completed by provider prior to medication history? No    Information Source(s): Patient via in-person    Pertinent Information: No prescription medications.    Changes made to PTA medication list:    Added: all meds    Deleted: old entries (2020) - cipro and oxycodone     Changed: None    Medication Affordability:  Not including over the counter (OTC) medications, was there a time in the past 3 months when you did not take your medications as prescribed because of cost?: Unable to Assess    Allergies reviewed with patient and updates made in EHR: yes    Medication History Completed By: Denia Ross RPH 6/30/2023 3:30 PM    Prior to Admission medications    Medication Sig Last Dose Taking? Auth Provider Long Term End Date   aspirin (ASA) 325 MG EC tablet Take 650 mg by mouth once 6/30/2023 Yes Unknown, Entered By History     ibuprofen (ADVIL/MOTRIN) 200 MG tablet Take 200 mg by mouth once 6/30/2023 Yes Unknown, Entered By History

## 2023-06-30 NOTE — ED TRIAGE NOTES
Patient has had 3 days of increased throat pain.  Patient was seen at urgent care and had a negative strep test and was sent to the ED.  Patient has difficulty talking due to the pain but states he can breath normally. Patient has also had fevers at home over the last 2 days but is afebrile in Triage. Wife states he had Tylenol this morning.      Triage Assessment     Row Name 06/30/23 1209       Triage Assessment (Adult)    Airway WDL WDL       Respiratory WDL    Respiratory WDL WDL       Skin Circulation/Temperature WDL    Skin Circulation/Temperature WDL WDL       Cardiac WDL    Cardiac WDL WDL       Peripheral/Neurovascular WDL    Peripheral Neurovascular WDL WDL       Cognitive/Neuro/Behavioral WDL    Cognitive/Neuro/Behavioral WDL WDL

## 2023-06-30 NOTE — CONSULTS
Consult Date: 06/30/2023    ENT CONSULT NOTE    REFERRING PHYSICIAN:  INDIO Ramirez    CONSULTING PHYSICIAN:  Jeff Costello MD    HISTORY OF PRESENT ILLNESS:  The patient is a 67-year-old male who presented to the emergency room with a 3-day history of sore throat.  Had some difficulty swallowing due to the discomfort and felt some change in voice and very slight impairment in breathing.  On evaluation in the emergency room, CT imaging was performed which suggested inflammation of both tonsils with possible small, less than 1 cm fluid collection or phlegmon involving the left tonsil with some surrounding pharyngeal edema.  The patient received IV Unasyn and IV steroids.  He improved symptomatically noticeably after the above treatment.  States his voice is much improved, and he is able to swallow better now.  Denies any active shortness of breath.  Denies any history of similar issues in the past.    PHYSICAL EXAMINATION:  The patient is lying semirecumbent in the exam bed in the emergency room.  He is smiling and in no distress.  Voice quality is very slightly gravelly, but there is no stridor. No drooling.  Ear exam normal bilaterally.  Nasal airways clear anteriorly.  Intraoral examination shows some inflammation of the tonsils bilaterally, but more prominently uvular edema.  Tonsils are fairly symmetric without any peritonsillar bulge or edema along the palate, and no evidence of visible or accessible peritonsillar abscess at this time.  Some tenderness, left greater than right neck, but no concerning adenopathy, neck induration, or evidence of neck abscess at this time.  I reviewed the CT imaging, which confirms the findings as previously documented.  No bulky peritonsillar abscess noted.  Had some pharyngeal edema, but did not appear to be obstructing of the airway.    ASSESSMENT AND PLAN:  Acute pharyngotonsillitis with possible very mild left peritonsillar abscess or just phlegmon.  He has improved  symptomatically markedly with initial IV antibiotics and IV steroids.  I do not see an accessible peritonsillar abscess that requires any surgical intervention at this time.  I think he will clear adequately with medical management alone.  I will admit for continued IV antibiotics overnight and additional IV Decadron 10 mg every 8 hours for 3 doses or so.  If doing much better tomorrow, could be discharged on oral antibiotics and possible Medrol Dosepak.  Please call with if he is developing any increasing issues that requires reevaluation.    Thank you for the consult.    Jeff Costello MD        D: 2023   T: 2023   MT: yulia    Name:     GATO OROZCO WENDY  MRN:      [id number]        Account:      [id number]   :      1955           Consult Date: 2023     Document: [id number]

## 2023-06-30 NOTE — CONSULTS
ENT consult--full note dictated.  No accessible abscess that would require any surgical intervention at this time. No airway distress. Should respond to medical therapy. Recommend IV Unasyn overnight and additional doses of Decadron 10 mg every eight hours through the night. If is better by tomorrow, could be discharged on oral medications. Call if any additional concerns.

## 2023-06-30 NOTE — H&P
[facility]    History and Physical - Hospitalist Service       Date of Admission:  6/30/2023    Assessment & Plan      Fredi Reilly is a 67 year old male admitted on 6/30/23 with PMH GERD, hx diverticulitis who presents with 3 days of a sore throat.    Acute bilateral pontine tonsillitis with possible early abscess   Mild leukocytosis   Sore throat started 3 days ago, has continued to worsen.  Difficult to swallow. The swelling has made it more difficult to talk and slight impairment in breathing. Feels much better after steroids in ED. No fevers but felt chilled last evening. Slight headache, otherwise no nasal congestion, rash, cough, SOB, chest pain, N/V. No hx of sore throats or strep throat. Strep PCR negative. Lactic acid 0.8. CT neck shows acute bilateral pontine tonsillitis including a 0.9 cm area of phelgmonous change or early abscess formation in the left palatine tonsil.  Plan:   - continue IV Unasyn   - IV decadron 10 mg q 8 hrs   - Full liquids for now until able to tolerating oral intake   - ENT consult for abscess recommendations   - Notify provider if develops fever, rigors, difficulty breathing  - MRSA swab ordered    GERD  - not currently on any medication    Elevated creatinine  Stable likely has underlying CKD. Creatinine range from 1.14 to 1.22.      Diet:  Full liquid  DVT Prophylaxis: Ambulate every shift  Sanchez Catheter: Not present  Lines: None     Cardiac Monitoring: None  Code Status:   Full code     Disposition Plan likely 1-2 days until swelling improves      The patient's care was discussed with the Patient.    CHER Alvarez PA-C  Hospitalist Service  [facility]  Securely message with [employer] (more info)  Text page via "[employer]" Paging/Directory     ______________________________________________________________________    Chief Complaint   Sore throat     History is obtained from the patient    History of Present Illness   Fredi Reilly  is a 67 year old male with PMH GERD, hx diverticulitis who presents with 3 days of a sore throat.    Sore throat started 3 days ago, has continued to worsen.  Difficult to swallow.  The swelling has made it more difficult to talk and slight impairment in breathing. No fevers but felt chilled that evening. Slight headache, otherwise no nasal congestion, rash, cough, SOB, chest pain, N/V. No hx of sore throats or strep throat.     In the ED, afebrile, blood pressure 142/83, heart rate 79, respirations 18, oxygen 96% on room air.  BMP is notable for creatinine of 1.22.  Lactic acid 0.8.  CBC notable for leukocytosis 11.6.  Strep PCR negative.  CT neck shows acute bilateral pontine tonsillitis including a 0.9 cm area of phelgmonous change or early abscess formation in the left palatine tonsil.    Past Medical History    Past Medical History:   Diagnosis Date     Gastroesophageal reflux disease      Hiatal hernia        Past Surgical History   Past Surgical History:   Procedure Laterality Date     HERNIORRHAPHY UMBILICAL N/A 11/1/2019    Procedure: Open umbilical hernia repair with mesh;  Surgeon: Tung Tellez MD;  Location: [facility] OR     ORTHOPEDIC SURGERY  05/2015    knee     Prior to Admission Medications   Prior to Admission Medications   Prescriptions Last Dose Informant Patient Reported? Taking?   ciprofloxacin (CIPRO) 750 MG tablet   No No   Sig: Take 1 tablet (750 mg) by mouth 2 times daily   oxyCODONE (ROXICODONE) 5 MG tablet   No No   Sig: Take 1-2 tablets (5-10 mg) by mouth every 3 hours as needed      Facility-Administered Medications: None      Social History   I have reviewed this patient's social history and updated it with pertinent information if needed.  Social History     Tobacco Use     Smoking status: Former     Packs/day: 0.00     Types: Cigarettes     Smokeless tobacco: Former     Quit date: 05/2009   Substance Use Topics     Alcohol use: Yes     Comment: 6 per wk     Drug use: Never      Allergies   No Known Allergies     Physical Exam   Vital Signs: Temp: 98.5  F (36.9  C)   BP: (!) 142/83 Pulse: 84   Resp: 18 SpO2: 96 %      Weight: 0 lbs 0 oz    GENERAL:  Alert, Comfortable, No acute distress. Sitting up in bed.   PSYCH: pleasant, oriented.  HEENT:  Normocephalic, No scleral icterus or conjunctival injection, normal hearing, oral mucosa moist, enlarged slightly erythematous tonsils, white pus or patch visible on the left tonsil. Tenderness with palpation on the exterior neck. No palpable lymph nodes.   NECK:  Supple  HEART:  Normal S1, S2 with no murmur, RRR  LUNGS:  Normal Respiratory effort. Clear to auscultation bilaterally with no wheezing, rales or ronchi.  SKIN:  Warm, dry to touch. No rash  NEUROLOGIC:  Speech slightly muffled, alert & orientated x 4, no focal deficits.     Medical Decision Making       MANAGEMENT DISCUSSED with the following over the past 24 hours: patient, ED provider   NOTE(S)/MEDICAL RECORDS REVIEWED over the past 24 hours: labs, imaging, progress notes      Data     I have personally reviewed the following data over the past 24 hrs:    11.6 (H)  \   14.4   / 241     136 99 9.2 /  105 (H)   4.2 26 1.22 (H) \       Procal: N/A CRP: N/A Lactic Acid: 0.8         Imaging results reviewed over the past 24 hrs:   Recent Results (from the past 24 hour(s))   Soft tissue neck CT w contrast    Narrative    CT OF THE NECK WITH CONTRAST  6/30/2023 2:12 PM     COMPARISON: None.    HISTORY: Throat pain    TECHNIQUE:  Axial CT images of the neck were acquired after the  intravenous administration of 100mL Isovue-370 nonionic iodinated  contrast material. Coronal reconstructions were created.    FINDINGS: There is enlargement and heterogeneous enhancement of the  palatine tonsils bilaterally (left greater than right) consistent with  tonsillitis. There is an ovoid focus of hypodensity in the left  palatine tonsil consistent with an area of phlegmonous change or early  abscess  formation measuring 0.9 cm in diameter. There is minimal  presumed reactive lymphadenopathy in the suprahyoid neck on the left.    There is no other cervical lymphadenopathy. There are no other fluid  collections or abscesses in the neck. The salivary glands are  unremarkable. No other mucosal space abnormalities identified. The  thyroid gland is within normal limits. The lung apices are clear.  There is no sinusitis or mastoiditis.      Impression    IMPRESSION:  1. Findings consistent with acute bilateral palatine tonsillitis  including a 0.9 cm area of phlegmonous change or early abscess  formation in the left palatine tonsil.  2. Mild presumed reactive lymphadenopathy in the suprahyoid neck on  both sides.  3. Otherwise, normal soft tissue neck CT.      Radiation dose for this scan was reduced using automated exposure  control, adjustment of the mA and/or kV according to patient size, or  iterative reconstruction technique    SUSAN YANEZ MD         SYSTEM ID:  [id number]

## 2023-07-01 VITALS
WEIGHT: 234.57 LBS | RESPIRATION RATE: 16 BRPM | TEMPERATURE: 97.6 F | BODY MASS INDEX: 34.74 KG/M2 | HEART RATE: 86 BPM | OXYGEN SATURATION: 96 % | DIASTOLIC BLOOD PRESSURE: 76 MMHG | HEIGHT: 69 IN | SYSTOLIC BLOOD PRESSURE: 130 MMHG

## 2023-07-01 LAB
ERYTHROCYTE [DISTWIDTH] IN BLOOD BY AUTOMATED COUNT: 12.2 % (ref 10–15)
HCT VFR BLD AUTO: 43.2 % (ref 40–53)
HGB BLD-MCNC: 14 G/DL (ref 13.3–17.7)
MCH RBC QN AUTO: 29.6 PG (ref 26.5–33)
MCHC RBC AUTO-ENTMCNC: 32.4 G/DL (ref 31.5–36.5)
MCV RBC AUTO: 91 FL (ref 78–100)
PLATELET # BLD AUTO: 243 10E3/UL (ref 150–450)
RBC # BLD AUTO: 4.73 10E6/UL (ref 4.4–5.9)
WBC # BLD AUTO: 14.3 10E3/UL (ref 4–11)

## 2023-07-01 PROCEDURE — 99238 HOSP IP/OBS DSCHRG MGMT 30/<: CPT | Performed by: PHYSICIAN ASSISTANT

## 2023-07-01 PROCEDURE — 85027 COMPLETE CBC AUTOMATED: CPT

## 2023-07-01 PROCEDURE — 250N000013 HC RX MED GY IP 250 OP 250 PS 637

## 2023-07-01 PROCEDURE — 36415 COLL VENOUS BLD VENIPUNCTURE: CPT

## 2023-07-01 PROCEDURE — 96376 TX/PRO/DX INJ SAME DRUG ADON: CPT

## 2023-07-01 PROCEDURE — 250N000011 HC RX IP 250 OP 636: Mod: JZ

## 2023-07-01 PROCEDURE — G0378 HOSPITAL OBSERVATION PER HR: HCPCS

## 2023-07-01 RX ORDER — ACETAMINOPHEN 325 MG/1
975 TABLET ORAL EVERY 8 HOURS PRN
COMMUNITY
Start: 2023-07-01

## 2023-07-01 RX ORDER — METHYLPREDNISOLONE 4 MG
TABLET, DOSE PACK ORAL
Qty: 21 TABLET | Refills: 0 | Status: SHIPPED | OUTPATIENT
Start: 2023-07-01

## 2023-07-01 RX ADMIN — DEXAMETHASONE SODIUM PHOSPHATE 10 MG: 10 INJECTION, SOLUTION INTRAMUSCULAR; INTRAVENOUS at 05:01

## 2023-07-01 RX ADMIN — ACETAMINOPHEN 975 MG: 325 TABLET, FILM COATED ORAL at 00:11

## 2023-07-01 RX ADMIN — AMPICILLIN SODIUM AND SULBACTAM SODIUM 3 G: 2; 1 INJECTION, POWDER, FOR SOLUTION INTRAMUSCULAR; INTRAVENOUS at 04:03

## 2023-07-01 ASSESSMENT — ACTIVITIES OF DAILY LIVING (ADL)
ADLS_ACUITY_SCORE: 31
ADLS_ACUITY_SCORE: 35
ADLS_ACUITY_SCORE: 31
ADLS_ACUITY_SCORE: 31
ADLS_ACUITY_SCORE: 35

## 2023-07-01 NOTE — PLAN OF CARE
PRIMARY DIAGNOSIS: Acute pharyngotonsillitis     OUTPATIENT/OBSERVATION GOALS TO BE MET BEFORE DISCHARGE:  ADLs back to baseline: Yes     Activity and level of assistance: Up with standby assistance.     Pain status: Pain free.     Return to near baseline physical activity: Yes          Discharge Planner Nurse   Safe discharge environment identified: Yes  Barriers to discharge: Yes       Entered by: Tung De Anda RN 07/01/2023 5:29 AM    Pt is A&O x4, tolerating oral medications. Pt received doses of unasyn and decadron. Pt is on full liquid diet. Plan is to discharge today back home. Pt is resting in bed and able to make needs known. Will continue to follow plan of care.     Please review provider order for any additional goals.   Nurse to notify provider when observation goals have been met and patient is ready for discharge.

## 2023-07-01 NOTE — PLAN OF CARE
ROOM # 204-1    Living Situation (if not independent, order SW consult): Home with Wife    Facility name:  : Sonam (spouse) [phone]    Activity level at baseline: Ind  Activity level on admit: Ind     Who will be transporting you at discharge: Sonam     Patient registered to observation; given Patient Bill of Rights; given the opportunity to ask questions about observation status and their plan of care.  Patient has been oriented to the observation room, bathroom and call light is in place.    Discussed discharge goals and expectations with patient/family.

## 2023-07-01 NOTE — DISCHARGE SUMMARY
"[facility]  Hospitalist Discharge Summary      Date of Admission:  6/30/2023  Date of Discharge:  7/1/2023 10:57 AM  Discharging Provider: Kelly Dennis PA-C  Discharge Service: Hospitalist Service    Discharge Diagnoses   Acute bilateral pontine tonsillitis with possible early abscess   Mild leukocytosis     Clinically Significant Risk Factors     # Obesity: Estimated body mass index is 34.62 kg/m  as calculated from the following:    Height as of this encounter: 1.753 m (5' 9.02\").    Weight as of this encounter: 106.4 kg (234 lb 9.1 oz).       Follow-ups Needed After Discharge   Follow-up Appointments     Follow-up and recommended labs and tests       Follow up with primary care provider, Dereje Camacho, within 7 days   for hospital follow- up.  No follow up labs or test are needed.  Complete course of antibiotics and medrol dose pack.   Follow up with ENT as needed            Unresulted Labs Ordered in the Past 30 Days of this Admission     No orders found for last 31 day(s).      These results will be followed up by PCP    Discharge Disposition   Discharged to home  Condition at discharge: Stable    Hospital Course      Fredi Reilly is a 67 year old male admitted on 6/30/23 with PMH GERD, hx diverticulitis who presents with 3 days of a sore throat.    Acute bilateral pontine tonsillitis with possible early abscess   Mild leukocytosis   Sore throat started 3 days ago, has continued to worsen.  Difficult to swallow. The swelling has made it more difficult to talk and slight impairment in breathing. Feels much better after steroids in ED. No fevers but felt chilled last evening. Slight headache, otherwise no nasal congestion, rash, cough, SOB, chest pain, N/V. No hx of sore throats or strep throat. Strep PCR negative. Lactic acid 0.8. CT neck shows acute bilateral pontine tonsillitis including a 0.9 cm area of phelgmonous change or early abscess formation in the left palatine " tonsil.  Admitted to OBS for IV abx and steroids  - ENT consulted, non drainable abscess, continue medical management  - improved with IV Unasyn, discharge on 13 additional days of Augmentin  - IV decadron 10 mg q 8 hrs   - symptoms improved  - medrol dose pack on discharge   - MRSA swab negative  - follow up with PCP in 1 week. Consider ENT follow up if sx worsen     GERD  - not currently on any medication    Elevated creatinine  Stable likely has underlying CKD. Creatinine range from 1.14 to 1.22.     Consultations This Hospital Stay   ENT IP CONSULT    Code Status   Full Code    Time Spent on this Encounter   I, Kelly Dennis PA-C, personally saw the patient today and spent less than or equal to 30 minutes discharging this patient.       Kelly Dennis PA-C  [facility] OBSERVATION DEPT  [address] [facility] [address]  Phone: [phone]  ______________________________________________________________________    Physical Exam   Vital Signs: Temp: 97.6  F (36.4  C) Temp src: Oral BP: 130/76 Pulse: 86   Resp: 16 SpO2: 96 % O2 Device: None (Room air)    Weight: 234 lbs 9.11 oz    GENERAL:  Comfortable.  PSYCH: pleasant, oriented, No acute distress.  HEENT:  Atraumatic, normocephalic. Swelling improved in left post pharynx..  HEART:  RRR  LUNGS:  Normal Respiratory effort. .   EXTREMITIES:  Able to ambulate independently.  SKIN:  Dry to touch, No rash, wound or ulcerations.  NEUROLOGIC:  Grossly intact       Primary Care Physician   Dereje Camacho    Discharge Orders      Reason for your hospital stay    Tonsillitis with possible early abscess. You were started on IV antibiotics and IV steroids and seen by ENT. ENT did not feel there was a drainable abscess so recommended further medical management. Symptoms improved and you were able to tolerate a regular diet prior to discharge.     Follow-up and recommended labs and tests     Follow up with primary care provider, Dereje  JOHN PAUL Camacho, within 7 days for hospital follow- up.  No follow up labs or test are needed.  Complete course of antibiotics and medrol dose pack.   Follow up with ENT as needed     Activity    Your activity upon discharge: activity as tolerated     When to contact your care team    Call your primary doctor if you have any of the following: temperature greater than 101, increased swelling, increased pain, or increase difficulty swallowing. If you have troubles breathing, return to the ED.     Diet    Follow this diet upon discharge: Regular as tolerated       Significant Results and Procedures   Most Recent 3 CBC's:Recent Labs   Lab Test 07/01/23  0517 06/30/23  1315 05/28/21  0951   WBC 14.3* 11.6* 9.3   HGB 14.0 14.4 13.6   MCV 91 93 92    241 224     Most Recent 3 BMP's:Recent Labs   Lab Test 06/30/23  1315 05/28/21  0951 02/27/20  0638    135 136   POTASSIUM 4.2 4.3 4.2   CHLORIDE 99 104 106   CO2 26 27 25   BUN 9.2 10 13   CR 1.22* 1.13 1.22   ANIONGAP 11 6 5   MUSHTAQ 9.3 9.2 8.5   * 97 91     7-Day Micro Results     Collected Updated Procedure Result Status      06/30/2023 1728 06/30/2023 2145 MRSA MSSA PCR, Nasal Swab [[id number]]    Swab from Nares, Bilateral    Final result Component Value   MRSA Target DNA Negative   SA Target DNA Positive            06/30/2023 1316 06/30/2023 1359 Group A Streptococcus PCR Throat Swab [[id number]]    Swab from Throat    Final result Component Value   Group A strep by PCR Not Detected              ,   Results for orders placed or performed during the hospital encounter of 06/30/23   Soft tissue neck CT w contrast    Narrative    CT OF THE NECK WITH CONTRAST  6/30/2023 2:12 PM     COMPARISON: None.    HISTORY: Throat pain    TECHNIQUE:  Axial CT images of the neck were acquired after the  intravenous administration of 100mL Isovue-370 nonionic iodinated  contrast material. Coronal reconstructions were created.    FINDINGS: There is enlargement and  heterogeneous enhancement of the  palatine tonsils bilaterally (left greater than right) consistent with  tonsillitis. There is an ovoid focus of hypodensity in the left  palatine tonsil consistent with an area of phlegmonous change or early  abscess formation measuring 0.9 cm in diameter. There is minimal  presumed reactive lymphadenopathy in the suprahyoid neck on the left.    There is no other cervical lymphadenopathy. There are no other fluid  collections or abscesses in the neck. The salivary glands are  unremarkable. No other mucosal space abnormalities identified. The  thyroid gland is within normal limits. The lung apices are clear.  There is no sinusitis or mastoiditis.      Impression    IMPRESSION:  1. Findings consistent with acute bilateral palatine tonsillitis  including a 0.9 cm area of phlegmonous change or early abscess  formation in the left palatine tonsil.  2. Mild presumed reactive lymphadenopathy in the suprahyoid neck on  both sides.  3. Otherwise, normal soft tissue neck CT.      Radiation dose for this scan was reduced using automated exposure  control, adjustment of the mA and/or kV according to patient size, or  iterative reconstruction technique    SUSAN YANEZ MD         SYSTEM ID:  [id number]       Discharge Medications   Current Discharge Medication List      START taking these medications    Details   acetaminophen (TYLENOL) 325 MG tablet Take 3 tablets (975 mg) by mouth every 8 hours as needed for mild pain    Associated Diagnoses: Tonsillitis, phlegmonous      amoxicillin-clavulanate (AUGMENTIN) 875-125 MG tablet Take 1 tablet by mouth 2 times daily for 13 days  Qty: 26 tablet, Refills: 0    Associated Diagnoses: Tonsillitis, phlegmonous      methylPREDNISolone (MEDROL DOSEPAK) 4 MG tablet therapy pack Follow Package Directions  Qty: 21 tablet, Refills: 0    Associated Diagnoses: Tonsillitis, phlegmonous         STOP taking these medications       aspirin (ASA) 325 MG EC tablet  Comments:   Reason for Stopping:         ibuprofen (ADVIL/MOTRIN) 200 MG tablet Comments:   Reason for Stopping:             Allergies   No Known Allergies

## 2023-07-01 NOTE — PLAN OF CARE
Patient's After Visit Summary was reviewed with patient   Patient verbalized understanding of After Visit Summary, recommended follow up and was given an opportunity to ask questions. yes  Discharge medications sent home with patient/family: YES (amoxicillin, methlprednisolone)  Discharged with spouse    OBSERVATION patient END time: 10:55am

## 2023-07-01 NOTE — PLAN OF CARE
PRIMARY DIAGNOSIS: Sore throat   OUTPATIENT/OBSERVATION GOALS TO BE MET BEFORE DISCHARGE:  ADLs back to baseline: Yes    Activity and level of assistance: Up with standby assistance.    Pain status: Pain free.    Return to near baseline physical activity: Yes     Discharge Planner Nurse   Safe discharge environment identified: Yes  Barriers to discharge: Yes       Entered by: Tung De Anda RN 07/01/2023 3:30 AM  Pt is A&O x4, tolerating oral medications. Pt is on full liquid diet. Pt is resting in bed and able to make needs known. Will continue to follow plan of care.   Please review provider order for any additional goals.   Nurse to notify provider when observation goals have been met and patient is ready for discharge.

## 2023-07-01 NOTE — PLAN OF CARE
PRIMARY DIAGNOSIS: Acute pharyngotonsillitis     OUTPATIENT/OBSERVATION GOALS TO BE MET BEFORE DISCHARGE:  1. ADLs back to baseline: Yes     2. Activity and level of assistance: Up independently      3. Pain status: Pain free.     4. Return to near baseline physical activity: Yes          Discharge Planner Nurse   Safe discharge environment identified: Yes  Barriers to discharge: Yes       Entered by: Tung De Anda RN 07/01/2023 5:29 AM    Pt is AOx4, VSS, LCTA, BS active, tolerating a regular diet this morning. Planning on patient discharging home today to go back home. He is up independently in room.      Please review provider order for any additional goals.   Nurse to notify provider when observation goals have been met and patient is ready for discharge.

## 2024-04-19 NOTE — ED NOTES
[facility]  ED Nurse Handoff Report    Fredi Reilly is a 64 year old male   ED Chief complaint: Abdominal Pain  . ED Diagnosis:   Final diagnoses:   Diverticulitis of large intestine with perforation, unspecified bleeding status     Allergies: No Known Allergies    Code Status: Full Code  Activity level - Baseline/Home:  Independent. Activity Level - Current:   Independent. Lift room needed: No. Bariatric: No   Needed: No   Isolation: No. Infection: Not Applicable.     Vital Signs:   Vitals:    02/26/20 1051 02/26/20 1130 02/26/20 1145 02/26/20 1230   BP:  107/75 110/76 120/85   Pulse:  90 83 85   Resp:       Temp:       TempSrc:       SpO2: 98% 99% 98% 100%   Weight:           Cardiac Rhythm:  ,      Pain level: 0-10 Pain Scale: 0  Patient confused: No. Patient Falls Risk: No.   Elimination Status: Has voided   Patient Report - Initial Complaint: abdominal pain. Focused Assessment: pt comes in with LLQ abdominal pain x1 week and hasn't had a BM in 5 days.  Tests Performed: labs, cultures, lactic acid, CT scan. Abnormal Results:   Abnormal Labs Reviewed   CBC WITH PLATELETS DIFFERENTIAL - Abnormal; Notable for the following components:       Result Value    WBC 16.3 (*)     RBC Count 4.23 (*)     Hemoglobin 12.2 (*)     Hematocrit 38.3 (*)     Absolute Neutrophil 14.1 (*)     All other components within normal limits   .   Treatments provided: antibiotics  Family Comments: n/a  OBS brochure/video discussed/provided to patient:  N/A  ED Medications:   Medications   piperacillin-tazobactam (ZOSYN) infusion 3.375 g (3.375 g Intravenous New Bag 2/26/20 1244)   HYDROmorphone (PF) (DILAUDID) injection 0.5 mg (has no administration in time range)   ondansetron (ZOFRAN) injection 4 mg (has no administration in time range)   iopamidol (ISOVUE-370) solution 500 mL (100 mLs Intravenous Given 2/26/20 1201)   CT Scan Flush (65 mLs Intravenous Given 2/26/20 1201)     Drips infusing:  No  For the  majority of the shift, the patient's behavior Green. Interventions performed were n/a.    Sepsis treatment initiated: No       ED Nurse Name/Phone Number: Meaghan Arceo RN,   12:51 PM     No

## (undated) DEVICE — SU VICRYL 4-0 PS-2 18" UND J496H

## (undated) DEVICE — SUCTION CANISTER MEDIVAC LINER 3000ML W/LID 65651-530

## (undated) DEVICE — NDL 22GA 1.5"

## (undated) DEVICE — BAG CLEAR TRASH 1.3M 39X33" P4040C

## (undated) DEVICE — SOL NACL 0.9% IRRIG 1000ML BOTTLE 07138-09

## (undated) DEVICE — BLADE CLIPPER 3M 9670

## (undated) DEVICE — LINEN TOWEL PACK X10 5473

## (undated) DEVICE — SU VICRYL 3-0 SH 27" J316H

## (undated) DEVICE — PACK MINOR CUSTOM RIDGES SBA32RMRMA

## (undated) DEVICE — SU VICRYL 3-0 TIE 12X18" J904T

## (undated) DEVICE — DRAPE LAP W/ARMBOARD 29410

## (undated) DEVICE — PREP CHLORAPREP 26ML TINTED ORANGE  260815

## (undated) DEVICE — GLOVE PROTEXIS POWDER FREE 7.5 ORTHOPEDIC 2D73ET75

## (undated) DEVICE — SU VICRYL 3-0 PS-2 18" UND J497H

## (undated) DEVICE — SU PDS II 0 CT-2 27" Z334H

## (undated) DEVICE — LINEN FULL SHEET 5511

## (undated) DEVICE — ESU GROUND PAD ADULT W/CORD E7507

## (undated) DEVICE — LINEN HALF SHEET 5512

## (undated) DEVICE — GLOVE PROTEXIS BLUE W/NEU-THERA 8.0  2D73EB80

## (undated) RX ORDER — HYDROCODONE BITARTRATE AND ACETAMINOPHEN 5; 325 MG/1; MG/1
TABLET ORAL
Status: DISPENSED
Start: 2019-11-01

## (undated) RX ORDER — FENTANYL CITRATE 50 UG/ML
INJECTION, SOLUTION INTRAMUSCULAR; INTRAVENOUS
Status: DISPENSED
Start: 2019-11-01

## (undated) RX ORDER — PROPOFOL 10 MG/ML
INJECTION, EMULSION INTRAVENOUS
Status: DISPENSED
Start: 2019-11-01

## (undated) RX ORDER — LIDOCAINE HYDROCHLORIDE 10 MG/ML
INJECTION, SOLUTION EPIDURAL; INFILTRATION; INTRACAUDAL; PERINEURAL
Status: DISPENSED
Start: 2019-11-01

## (undated) RX ORDER — BUPIVACAINE HYDROCHLORIDE 5 MG/ML
INJECTION, SOLUTION EPIDURAL; INTRACAUDAL
Status: DISPENSED
Start: 2019-11-01

## (undated) RX ORDER — CEFAZOLIN SODIUM 2 G/100ML
INJECTION, SOLUTION INTRAVENOUS
Status: DISPENSED
Start: 2019-11-01

## (undated) RX ORDER — OXYCODONE HYDROCHLORIDE 5 MG/1
TABLET ORAL
Status: DISPENSED
Start: 2019-11-01

## (undated) RX ORDER — KETAMINE HCL IN 0.9 % NACL 50 MG/5 ML
SYRINGE (ML) INTRAVENOUS
Status: DISPENSED
Start: 2019-11-01